# Patient Record
Sex: FEMALE | Race: ASIAN | NOT HISPANIC OR LATINO | URBAN - METROPOLITAN AREA
[De-identification: names, ages, dates, MRNs, and addresses within clinical notes are randomized per-mention and may not be internally consistent; named-entity substitution may affect disease eponyms.]

---

## 2021-11-01 ENCOUNTER — EMERGENCY (EMERGENCY)
Facility: HOSPITAL | Age: 31
LOS: 1 days | Discharge: HOME | End: 2021-11-01
Attending: EMERGENCY MEDICINE | Admitting: EMERGENCY MEDICINE
Payer: MEDICAID

## 2021-11-01 VITALS
RESPIRATION RATE: 18 BRPM | DIASTOLIC BLOOD PRESSURE: 86 MMHG | SYSTOLIC BLOOD PRESSURE: 126 MMHG | TEMPERATURE: 98 F | HEART RATE: 79 BPM | OXYGEN SATURATION: 100 %

## 2021-11-01 PROCEDURE — 99284 EMERGENCY DEPT VISIT MOD MDM: CPT

## 2021-11-01 NOTE — ED ADULT TRIAGE NOTE - CHIEF COMPLAINT QUOTE
Pt c/o vaginal bleeding. States she is ~8-9 weeks pregnant, had headache earlier today followed by lower abdominal cramping and vaginal bleeding this evening. No hx of miscarriage. Denies n/v/d. Denies PMHx

## 2021-11-02 VITALS
HEART RATE: 73 BPM | SYSTOLIC BLOOD PRESSURE: 106 MMHG | TEMPERATURE: 98 F | DIASTOLIC BLOOD PRESSURE: 68 MMHG | OXYGEN SATURATION: 100 % | RESPIRATION RATE: 15 BRPM

## 2021-11-02 LAB
ALBUMIN SERPL ELPH-MCNC: 4.9 G/DL — SIGNIFICANT CHANGE UP (ref 3.3–5)
ALP SERPL-CCNC: 67 U/L — SIGNIFICANT CHANGE UP (ref 40–120)
ALT FLD-CCNC: 14 U/L — SIGNIFICANT CHANGE UP (ref 4–33)
ANION GAP SERPL CALC-SCNC: 11 MMOL/L — SIGNIFICANT CHANGE UP (ref 7–14)
APPEARANCE UR: CLEAR — SIGNIFICANT CHANGE UP
AST SERPL-CCNC: 18 U/L — SIGNIFICANT CHANGE UP (ref 4–32)
BASOPHILS # BLD AUTO: 0.09 K/UL — SIGNIFICANT CHANGE UP (ref 0–0.2)
BASOPHILS NFR BLD AUTO: 1 % — SIGNIFICANT CHANGE UP (ref 0–2)
BILIRUB SERPL-MCNC: 0.2 MG/DL — SIGNIFICANT CHANGE UP (ref 0.2–1.2)
BILIRUB UR-MCNC: NEGATIVE — SIGNIFICANT CHANGE UP
BLD GP AB SCN SERPL QL: NEGATIVE — SIGNIFICANT CHANGE UP
BUN SERPL-MCNC: 12 MG/DL — SIGNIFICANT CHANGE UP (ref 7–23)
CALCIUM SERPL-MCNC: 10.1 MG/DL — SIGNIFICANT CHANGE UP (ref 8.4–10.5)
CHLORIDE SERPL-SCNC: 105 MMOL/L — SIGNIFICANT CHANGE UP (ref 98–107)
CO2 SERPL-SCNC: 23 MMOL/L — SIGNIFICANT CHANGE UP (ref 22–31)
COLOR SPEC: SIGNIFICANT CHANGE UP
CREAT SERPL-MCNC: 0.64 MG/DL — SIGNIFICANT CHANGE UP (ref 0.5–1.3)
DIFF PNL FLD: ABNORMAL
EOSINOPHIL # BLD AUTO: 0.39 K/UL — SIGNIFICANT CHANGE UP (ref 0–0.5)
EOSINOPHIL NFR BLD AUTO: 4.2 % — SIGNIFICANT CHANGE UP (ref 0–6)
GLUCOSE SERPL-MCNC: 86 MG/DL — SIGNIFICANT CHANGE UP (ref 70–99)
GLUCOSE UR QL: NEGATIVE — SIGNIFICANT CHANGE UP
HCG SERPL-ACNC: 2197 MIU/ML — SIGNIFICANT CHANGE UP
HCT VFR BLD CALC: 36.8 % — SIGNIFICANT CHANGE UP (ref 34.5–45)
HGB BLD-MCNC: 12.8 G/DL — SIGNIFICANT CHANGE UP (ref 11.5–15.5)
IANC: 5.23 K/UL — SIGNIFICANT CHANGE UP (ref 1.5–8.5)
IMM GRANULOCYTES NFR BLD AUTO: 0.2 % — SIGNIFICANT CHANGE UP (ref 0–1.5)
KETONES UR-MCNC: ABNORMAL
LEUKOCYTE ESTERASE UR-ACNC: NEGATIVE — SIGNIFICANT CHANGE UP
LYMPHOCYTES # BLD AUTO: 2.92 K/UL — SIGNIFICANT CHANGE UP (ref 1–3.3)
LYMPHOCYTES # BLD AUTO: 31.3 % — SIGNIFICANT CHANGE UP (ref 13–44)
MCHC RBC-ENTMCNC: 26.9 PG — LOW (ref 27–34)
MCHC RBC-ENTMCNC: 34.8 GM/DL — SIGNIFICANT CHANGE UP (ref 32–36)
MCV RBC AUTO: 77.3 FL — LOW (ref 80–100)
MONOCYTES # BLD AUTO: 0.69 K/UL — SIGNIFICANT CHANGE UP (ref 0–0.9)
MONOCYTES NFR BLD AUTO: 7.4 % — SIGNIFICANT CHANGE UP (ref 2–14)
NEUTROPHILS # BLD AUTO: 5.23 K/UL — SIGNIFICANT CHANGE UP (ref 1.8–7.4)
NEUTROPHILS NFR BLD AUTO: 55.9 % — SIGNIFICANT CHANGE UP (ref 43–77)
NITRITE UR-MCNC: NEGATIVE — SIGNIFICANT CHANGE UP
NRBC # BLD: 0 /100 WBCS — SIGNIFICANT CHANGE UP
NRBC # FLD: 0 K/UL — SIGNIFICANT CHANGE UP
PH UR: 6 — SIGNIFICANT CHANGE UP (ref 5–8)
PLATELET # BLD AUTO: 298 K/UL — SIGNIFICANT CHANGE UP (ref 150–400)
POTASSIUM SERPL-MCNC: 3.7 MMOL/L — SIGNIFICANT CHANGE UP (ref 3.5–5.3)
POTASSIUM SERPL-SCNC: 3.7 MMOL/L — SIGNIFICANT CHANGE UP (ref 3.5–5.3)
PROT SERPL-MCNC: 8.1 G/DL — SIGNIFICANT CHANGE UP (ref 6–8.3)
PROT UR-MCNC: NEGATIVE — SIGNIFICANT CHANGE UP
RBC # BLD: 4.76 M/UL — SIGNIFICANT CHANGE UP (ref 3.8–5.2)
RBC # FLD: 16.3 % — HIGH (ref 10.3–14.5)
RH IG SCN BLD-IMP: POSITIVE — SIGNIFICANT CHANGE UP
SODIUM SERPL-SCNC: 139 MMOL/L — SIGNIFICANT CHANGE UP (ref 135–145)
SP GR SPEC: 1.02 — SIGNIFICANT CHANGE UP (ref 1–1.05)
UROBILINOGEN FLD QL: SIGNIFICANT CHANGE UP
WBC # BLD: 9.34 K/UL — SIGNIFICANT CHANGE UP (ref 3.8–10.5)
WBC # FLD AUTO: 9.34 K/UL — SIGNIFICANT CHANGE UP (ref 3.8–10.5)

## 2021-11-02 PROCEDURE — 76817 TRANSVAGINAL US OBSTETRIC: CPT | Mod: 26

## 2021-11-02 RX ORDER — ACETAMINOPHEN 500 MG
975 TABLET ORAL ONCE
Refills: 0 | Status: COMPLETED | OUTPATIENT
Start: 2021-11-02 | End: 2021-11-02

## 2021-11-02 RX ADMIN — Medication 975 MILLIGRAM(S): at 03:26

## 2021-11-02 NOTE — ED PROVIDER NOTE - NSFOLLOWUPINSTRUCTIONS_ED_ALL_ED_FT
Thank you for visiting our Emergency Department, it has been a pleasure taking part in your healthcare.    Your discharge diagnosis is: threatened  Thank you for visiting our Emergency Department, it has been a pleasure taking part in your healthcare.    Your discharge diagnosis is: threatened , abnormal transvaginal ultrasound  Please take all discharge medications as indicated below:  Please continue all medications as rx'd by your PMD.  Please follow up with your PMD within x48 hours.  Please follow up with OBGYN within x48 hours.  PLEASE CALL YOUR OBGYN DOCTOR TODAY  A copy of resulted labs, imaging, and findings have been provided to you.   You have had a detailed discussion with your provider regarding your diagnosis, care management and discharge planning including, but not limited to: return precautions, follow up visits with existing or new providers, new prescriptions and/or medication changes, wound and/or splint/cast care or other care   aspects specific to your diagnosis and treatment. You have been given the opportunity to have your questions answered. At this time you have been deemed stable and fit for discharge.  Return precautions to the Emergency Department include but are not limited to: unrelenting nausea, vomiting, fever, chills, chest pain, shortness of breath, dizziness, chest or abdominal pain, worsening back pain, syncope, blood in urine or stool, headache that doesn't resolve, numbness or tingling, loss of sensation, loss of motor function, or any other concerning symptoms.

## 2021-11-02 NOTE — ED PROVIDER NOTE - PATIENT PORTAL LINK FT
You can access the FollowMyHealth Patient Portal offered by [facility] by registering at the following website: [email or web address]. By joining [employer]’s FollowMyHealth portal, you will also be able to view your health information using other applications (apps) compatible with our system. You can access the FollowMyHealth Patient Portal offered by [facility] by registering at the following website: [email or web address]. By joining [employer]’s FollowMyHealth portal, you will also be able to view your health information using other applications (apps) compatible with our system.

## 2021-11-02 NOTE — CHART NOTE - NSCHARTNOTEFT_GEN_A_CORE
Received call from ED regarding this patient, for pregnancy of unknown location. TVUS read reports gestational sac + fetal pole, clarified with radiology that this was seen. This confirms IUP.   Discussed with Tu, ED Provider.     KENDRA Hedrick, PGY-2

## 2021-11-02 NOTE — ED PROVIDER NOTE - CLINICAL SUMMARY MEDICAL DECISION MAKING FREE TEXT BOX
vaginal bleeding in pregnancy.  us ordered, labs sent.  dispo as per results.  signing out to Dr. Willson at 2am.

## 2021-11-02 NOTE — ED PROVIDER NOTE - PROGRESS NOTE DETAILS
Demetris WILLIS: Received sign out from my colleague Dr. Ruiz pt presents preg w vaginal bleeding pending labs and imaging at time of sign out, TVUS results noted had at length discussion w pt and  regarding c/f non viable preg and threat ab, they report they have OB appointment on Wed, instructed them to call OB today, and to keep their appointment for Wed, pt reports sill having some vaginal bleeding, however is controlled, Rh pos, vitals stable, pt agreeable w plan for dc w close ob folow up strict return precautions discussed and expressed understanding.

## 2021-11-02 NOTE — ED PROVIDER NOTE - OBJECTIVE STATEMENT
robyn: pt here with pregnancy and vaginal bleeding.  pt , knows her blood type b positive, has been to her gyn and was told on us it was "too early to see well".  doesn't have access to labs or us.  pt denies any other pmhx, allergies, pshx.  non smoker.  bleeding started today.  this is a desired pregnancy    denies N/V/abd pain/ HA/ fever/ chest pain/ SOB/ dysuria/ urgency/  extremity issue

## 2021-11-02 NOTE — ED PROVIDER NOTE - CARE PLAN
Principal Discharge DX:	Vaginal bleeding   1 Principal Discharge DX:	, threatened  Secondary Diagnosis:	Abnormal transvaginal ultrasound

## 2021-11-02 NOTE — ED ADULT NURSE NOTE - OBJECTIVE STATEMENT
pt received to int 12 , a&ox 4 and ambulatory , No prior pmh p/w vaginal bleed since 7pm. Pt verbalized she is 8=9 weeks pregnant. Denies pad saturation with vaginal bleed. Described bleed as spotting. Pt breathing even and unlabored on room air. Denies fever, chills, cough, SOB, chest pain, palpitations, dizziness, N/V/D, constipation, numbness, tingling. 20G IV placed to right AC. Labs collected and sent. Stretcher in lowest position, wheels locked, appropriate side rails in place, call bell in reach.

## 2021-11-03 ENCOUNTER — EMERGENCY (EMERGENCY)
Facility: HOSPITAL | Age: 31
LOS: 1 days | Discharge: HOME | End: 2021-11-03
Attending: EMERGENCY MEDICINE | Admitting: EMERGENCY MEDICINE
Payer: MEDICAID

## 2021-11-03 VITALS
DIASTOLIC BLOOD PRESSURE: 65 MMHG | OXYGEN SATURATION: 100 % | RESPIRATION RATE: 20 BRPM | SYSTOLIC BLOOD PRESSURE: 105 MMHG | TEMPERATURE: 97 F | HEART RATE: 65 BPM

## 2021-11-03 VITALS
TEMPERATURE: 98 F | HEART RATE: 73 BPM | OXYGEN SATURATION: 100 % | DIASTOLIC BLOOD PRESSURE: 78 MMHG | RESPIRATION RATE: 18 BRPM | SYSTOLIC BLOOD PRESSURE: 114 MMHG

## 2021-11-03 VITALS
RESPIRATION RATE: 18 BRPM | DIASTOLIC BLOOD PRESSURE: 55 MMHG | SYSTOLIC BLOOD PRESSURE: 109 MMHG | OXYGEN SATURATION: 100 % | HEART RATE: 66 BPM

## 2021-11-03 VITALS
RESPIRATION RATE: 19 BRPM | TEMPERATURE: 98 F | HEART RATE: 68 BPM | DIASTOLIC BLOOD PRESSURE: 65 MMHG | SYSTOLIC BLOOD PRESSURE: 101 MMHG | OXYGEN SATURATION: 100 %

## 2021-11-03 PROBLEM — Z78.9 OTHER SPECIFIED HEALTH STATUS: Chronic | Status: ACTIVE | Noted: 2021-11-02

## 2021-11-03 LAB
ALBUMIN SERPL ELPH-MCNC: 4 G/DL — SIGNIFICANT CHANGE UP (ref 3.3–5)
ALBUMIN SERPL ELPH-MCNC: 4.3 G/DL — SIGNIFICANT CHANGE UP (ref 3.3–5)
ALP SERPL-CCNC: 58 U/L — SIGNIFICANT CHANGE UP (ref 40–120)
ALP SERPL-CCNC: 66 U/L — SIGNIFICANT CHANGE UP (ref 40–120)
ALT FLD-CCNC: 12 U/L — SIGNIFICANT CHANGE UP (ref 4–33)
ALT FLD-CCNC: 16 U/L — SIGNIFICANT CHANGE UP (ref 4–33)
ANION GAP SERPL CALC-SCNC: 13 MMOL/L — SIGNIFICANT CHANGE UP (ref 7–14)
ANION GAP SERPL CALC-SCNC: 14 MMOL/L — SIGNIFICANT CHANGE UP (ref 7–14)
AST SERPL-CCNC: 18 U/L — SIGNIFICANT CHANGE UP (ref 4–32)
AST SERPL-CCNC: 19 U/L — SIGNIFICANT CHANGE UP (ref 4–32)
BASOPHILS # BLD AUTO: 0.06 K/UL — SIGNIFICANT CHANGE UP (ref 0–0.2)
BASOPHILS NFR BLD AUTO: 0.6 % — SIGNIFICANT CHANGE UP (ref 0–2)
BILIRUB SERPL-MCNC: 0.3 MG/DL — SIGNIFICANT CHANGE UP (ref 0.2–1.2)
BILIRUB SERPL-MCNC: 0.4 MG/DL — SIGNIFICANT CHANGE UP (ref 0.2–1.2)
BLD GP AB SCN SERPL QL: NEGATIVE — SIGNIFICANT CHANGE UP
BLOOD GAS VENOUS COMPREHENSIVE RESULT: SIGNIFICANT CHANGE UP
BUN SERPL-MCNC: 6 MG/DL — LOW (ref 7–23)
BUN SERPL-MCNC: 9 MG/DL — SIGNIFICANT CHANGE UP (ref 7–23)
CALCIUM SERPL-MCNC: 9.1 MG/DL — SIGNIFICANT CHANGE UP (ref 8.4–10.5)
CALCIUM SERPL-MCNC: 9.3 MG/DL — SIGNIFICANT CHANGE UP (ref 8.4–10.5)
CHLORIDE SERPL-SCNC: 104 MMOL/L — SIGNIFICANT CHANGE UP (ref 98–107)
CHLORIDE SERPL-SCNC: 109 MMOL/L — HIGH (ref 98–107)
CO2 SERPL-SCNC: 16 MMOL/L — LOW (ref 22–31)
CO2 SERPL-SCNC: 18 MMOL/L — LOW (ref 22–31)
CREAT SERPL-MCNC: 0.51 MG/DL — SIGNIFICANT CHANGE UP (ref 0.5–1.3)
CREAT SERPL-MCNC: 0.58 MG/DL — SIGNIFICANT CHANGE UP (ref 0.5–1.3)
CULTURE RESULTS: NO GROWTH — SIGNIFICANT CHANGE UP
EOSINOPHIL # BLD AUTO: 0.27 K/UL — SIGNIFICANT CHANGE UP (ref 0–0.5)
EOSINOPHIL NFR BLD AUTO: 2.5 % — SIGNIFICANT CHANGE UP (ref 0–6)
GLUCOSE SERPL-MCNC: 105 MG/DL — HIGH (ref 70–99)
GLUCOSE SERPL-MCNC: 113 MG/DL — HIGH (ref 70–99)
HCG SERPL-ACNC: 1396 MIU/ML — SIGNIFICANT CHANGE UP
HCT VFR BLD CALC: 34.7 % — SIGNIFICANT CHANGE UP (ref 34.5–45)
HCT VFR BLD CALC: 38.2 % — SIGNIFICANT CHANGE UP (ref 34.5–45)
HGB BLD-MCNC: 11.1 G/DL — LOW (ref 11.5–15.5)
HGB BLD-MCNC: 11.7 G/DL — SIGNIFICANT CHANGE UP (ref 11.5–15.5)
IANC: 7.95 K/UL — SIGNIFICANT CHANGE UP (ref 1.5–8.5)
IMM GRANULOCYTES NFR BLD AUTO: 0.2 % — SIGNIFICANT CHANGE UP (ref 0–1.5)
INR BLD: 1.17 RATIO — HIGH (ref 0.88–1.16)
LYMPHOCYTES # BLD AUTO: 1.71 K/UL — SIGNIFICANT CHANGE UP (ref 1–3.3)
LYMPHOCYTES # BLD AUTO: 16 % — SIGNIFICANT CHANGE UP (ref 13–44)
MCHC RBC-ENTMCNC: 24.4 PG — LOW (ref 27–34)
MCHC RBC-ENTMCNC: 24.8 PG — LOW (ref 27–34)
MCHC RBC-ENTMCNC: 30.6 GM/DL — LOW (ref 32–36)
MCHC RBC-ENTMCNC: 32 GM/DL — SIGNIFICANT CHANGE UP (ref 32–36)
MCV RBC AUTO: 77.5 FL — LOW (ref 80–100)
MCV RBC AUTO: 79.6 FL — LOW (ref 80–100)
MONOCYTES # BLD AUTO: 0.69 K/UL — SIGNIFICANT CHANGE UP (ref 0–0.9)
MONOCYTES NFR BLD AUTO: 6.4 % — SIGNIFICANT CHANGE UP (ref 2–14)
NEUTROPHILS # BLD AUTO: 7.95 K/UL — HIGH (ref 1.8–7.4)
NEUTROPHILS NFR BLD AUTO: 74.3 % — SIGNIFICANT CHANGE UP (ref 43–77)
NRBC # BLD: 0 /100 WBCS — SIGNIFICANT CHANGE UP
NRBC # BLD: 0 /100 WBCS — SIGNIFICANT CHANGE UP
NRBC # FLD: 0 K/UL — SIGNIFICANT CHANGE UP
NRBC # FLD: 0 K/UL — SIGNIFICANT CHANGE UP
PLATELET # BLD AUTO: 235 K/UL — SIGNIFICANT CHANGE UP (ref 150–400)
PLATELET # BLD AUTO: 250 K/UL — SIGNIFICANT CHANGE UP (ref 150–400)
POTASSIUM SERPL-MCNC: 3.7 MMOL/L — SIGNIFICANT CHANGE UP (ref 3.5–5.3)
POTASSIUM SERPL-MCNC: 3.9 MMOL/L — SIGNIFICANT CHANGE UP (ref 3.5–5.3)
POTASSIUM SERPL-SCNC: 3.7 MMOL/L — SIGNIFICANT CHANGE UP (ref 3.5–5.3)
POTASSIUM SERPL-SCNC: 3.9 MMOL/L — SIGNIFICANT CHANGE UP (ref 3.5–5.3)
PROT SERPL-MCNC: 7 G/DL — SIGNIFICANT CHANGE UP (ref 6–8.3)
PROT SERPL-MCNC: 7.5 G/DL — SIGNIFICANT CHANGE UP (ref 6–8.3)
PROTHROM AB SERPL-ACNC: 13.2 SEC — SIGNIFICANT CHANGE UP (ref 10.6–13.6)
RBC # BLD: 4.48 M/UL — SIGNIFICANT CHANGE UP (ref 3.8–5.2)
RBC # BLD: 4.8 M/UL — SIGNIFICANT CHANGE UP (ref 3.8–5.2)
RBC # FLD: 15.9 % — HIGH (ref 10.3–14.5)
RBC # FLD: 16 % — HIGH (ref 10.3–14.5)
RH IG SCN BLD-IMP: POSITIVE — SIGNIFICANT CHANGE UP
SARS-COV-2 RNA SPEC QL NAA+PROBE: SIGNIFICANT CHANGE UP
SODIUM SERPL-SCNC: 136 MMOL/L — SIGNIFICANT CHANGE UP (ref 135–145)
SODIUM SERPL-SCNC: 138 MMOL/L — SIGNIFICANT CHANGE UP (ref 135–145)
SPECIMEN SOURCE: SIGNIFICANT CHANGE UP
WBC # BLD: 10.7 K/UL — HIGH (ref 3.8–10.5)
WBC # BLD: 13.68 K/UL — HIGH (ref 3.8–10.5)
WBC # FLD AUTO: 10.7 K/UL — HIGH (ref 3.8–10.5)
WBC # FLD AUTO: 13.68 K/UL — HIGH (ref 3.8–10.5)

## 2021-11-03 PROCEDURE — 93010 ELECTROCARDIOGRAM REPORT: CPT

## 2021-11-03 PROCEDURE — 99285 EMERGENCY DEPT VISIT HI MDM: CPT | Mod: 25

## 2021-11-03 PROCEDURE — 99218: CPT

## 2021-11-03 PROCEDURE — 76830 TRANSVAGINAL US NON-OB: CPT | Mod: 26

## 2021-11-03 RX ORDER — SODIUM CHLORIDE 9 MG/ML
1000 INJECTION INTRAMUSCULAR; INTRAVENOUS; SUBCUTANEOUS ONCE
Refills: 0 | Status: COMPLETED | OUTPATIENT
Start: 2021-11-03 | End: 2021-11-03

## 2021-11-03 RX ORDER — METOCLOPRAMIDE HCL 10 MG
10 TABLET ORAL ONCE
Refills: 0 | Status: COMPLETED | OUTPATIENT
Start: 2021-11-03 | End: 2021-11-03

## 2021-11-03 RX ORDER — ONDANSETRON 8 MG/1
4 TABLET, FILM COATED ORAL ONCE
Refills: 0 | Status: COMPLETED | OUTPATIENT
Start: 2021-11-03 | End: 2021-11-03

## 2021-11-03 RX ORDER — MORPHINE SULFATE 50 MG/1
4 CAPSULE, EXTENDED RELEASE ORAL ONCE
Refills: 0 | Status: DISCONTINUED | OUTPATIENT
Start: 2021-11-03 | End: 2021-11-03

## 2021-11-03 RX ORDER — ACETAMINOPHEN 500 MG
1000 TABLET ORAL ONCE
Refills: 0 | Status: COMPLETED | OUTPATIENT
Start: 2021-11-03 | End: 2021-11-03

## 2021-11-03 RX ADMIN — Medication 400 MILLIGRAM(S): at 09:39

## 2021-11-03 RX ADMIN — Medication 1000 MILLIGRAM(S): at 12:15

## 2021-11-03 RX ADMIN — ONDANSETRON 4 MILLIGRAM(S): 8 TABLET, FILM COATED ORAL at 09:41

## 2021-11-03 RX ADMIN — MORPHINE SULFATE 4 MILLIGRAM(S): 50 CAPSULE, EXTENDED RELEASE ORAL at 05:00

## 2021-11-03 RX ADMIN — Medication 10 MILLIGRAM(S): at 09:41

## 2021-11-03 RX ADMIN — SODIUM CHLORIDE 1000 MILLILITER(S): 9 INJECTION INTRAMUSCULAR; INTRAVENOUS; SUBCUTANEOUS at 09:39

## 2021-11-03 RX ADMIN — SODIUM CHLORIDE 1000 MILLILITER(S): 9 INJECTION INTRAMUSCULAR; INTRAVENOUS; SUBCUTANEOUS at 12:14

## 2021-11-03 NOTE — CONSULT NOTE ADULT - ASSESSMENT
***pending*** 32y/o  @11w5d LMP 2021 presenting to the ED complaining of vaginal bleeding and cramping. Patient in stable condition, vaginal bleeding appropriate, saturated half of a medium sized pad in the ED in 5 hours, H/H stable.  - No acute GYN intervention at this time  - TVUS with similar findings as overnight, suspicious for failed pregnancy  - Patient to f/u with private OBGYN for mgmt, serial bHCG, and US  - Pt cleared for d/c    D/W Dr. Christian Hedrick, PGY-2

## 2021-11-03 NOTE — ED PROVIDER NOTE - ATTENDING CONTRIBUTION TO CARE
Afebrile. Patient anxious as RN to stay in room to hold her hand. Awake and Alert. Lungs CTA. Heart RRR. Abdomen soft NTND. CN II-XII grossly intact. Moves all extremities without lateralization.    Non-viable appearing early pregnancy yesterday in ED now with abdominal cramping and bleeding  HD monitoring  Pt has had 2 TV US in past 24 hours, declines another  Has Gyn follow-up  Likely threatened

## 2021-11-03 NOTE — ED PROVIDER NOTE - PATIENT PORTAL LINK FT
You can access the FollowMyHealth Patient Portal offered by [facility] by registering at the following website: [email or web address]. By joining [email or web address]’s FollowMyHealth portal, you will also be able to view your health information using other applications (apps) compatible with our system.

## 2021-11-03 NOTE — ED PROVIDER NOTE - PHYSICAL EXAMINATION
Well appearing, well nourished, awake, alert, oriented to person, place, time/situation and in moderate pain/nausea distress.    Airway patent    Eyes without scleral injection. No jaundice.    Strong pulse.    Respirations unlabored.    Abdomen soft, non-tender, no guarding.    Spine appears normal, range of motion is not limited, no muscle or joint tenderness.    Alert and oriented, no gross motor or sensory deficits.    Skin normal color for race, warm, dry and intact. No evidence of rash.    No SI/HI. Well appearing, well nourished, awake, alert, oriented to person, place, time/situation and in moderate pain/nausea distress.    Airway patent    Eyes without scleral injection. No jaundice.    Strong pulse.    Respirations unlabored.    Abdomen soft, non-tender, no guarding. Blood on Maxi-Pad and underwear.    Spine appears normal, range of motion is not limited, no muscle or joint tenderness.    Alert and oriented, no gross motor or sensory deficits.    Skin normal color for race, warm, dry and intact. No evidence of rash.    No SI/HI.

## 2021-11-03 NOTE — ED ADULT NURSE NOTE - OBJECTIVE STATEMENT
Receive pt. in Intake room 9 alert and oriented x 4, presenting to the ER with complaints of lower abdominal pain and vaginal bleeding. Pt. stated " I was here this morning I am having a miscarriage I was discharge and on my way home I had a lot of pain and bleeding.". Medicated as ordered, no c/o dizziness, pt. has moderate amount of vaginal bleeding. Sanitary pads given, will continue to monitor.

## 2021-11-03 NOTE — ED ADULT TRIAGE NOTE - CHIEF COMPLAINT QUOTE
Pt. reports she is 10 weeks pregnant c/o vaginal bleeding with clots that began at midnight. Also endorses abd cramping and used 3-4 pads. C/o generalized weakness.

## 2021-11-03 NOTE — ED PROVIDER NOTE - OBJECTIVE STATEMENT
Hortensia: This is the patient's third visit in 24 hours. C/o pelvic cramping and VB. Yesterday lab results unremarkable, but US found non-viable pregnancy and sub-chorionic hematoma. She was evaluated by OB a few hours ago, and was in so much pain causing nausea and vomiting that she returned to the ER prior to getting to her 9 AM appt. today. She has been continuously bleeding since yesterday. Hortensia: , 11 wk, 5 d preg. This is the patient's third visit in 24 hours. C/o pelvic cramping and VB. Yesterday lab results unremarkable other than HCG decreasing from ~2,200 to ~1,400, but US found non-viable pregnancy and sub-chorionic hematoma. She was evaluated by OB a few hours ago, and was in so much pain causing nausea and vomiting that she returned to the ER prior to getting to her 9 AM appt. today. She has been bleeding since yesterday.

## 2021-11-03 NOTE — ED CDU PROVIDER INITIAL DAY NOTE - MEDICAL DECISION MAKING DETAILS
this is a 31 y.o female whom is 11 weeks pregnant  this is the patients 3rd visit in past 24 hours, presented to the ED complaining of having vaginal bleeding and and cramping over the past day. Patient currently feeling better, patient was evaluated by obgyn and trae to go home, patient pain in control

## 2021-11-03 NOTE — ED CDU PROVIDER DISPOSITION NOTE - CLINICAL COURSE
this is a  11 wk, pregnant patient whom presented to the ED 3 times within the last day, came in for vaginal bleeding and cramping. patient has a trending down HCG, and also had labs within normal limits, her hemoglobin was normal as well. Pt was evaluated by OBGYN whom recommended for the patient to be discharged and follow up in clinic tomorrow morning. patient currently feels better no other complaints.

## 2021-11-03 NOTE — ED PROVIDER NOTE - OBJECTIVE STATEMENT
31F  p/w lower abdominal pain and vaginal bleeding.  Pt. was seen in the ED yesterday for same complaint.  TVUS showed a likely non-viable pregnancy.  Pt. was advised to f/u w/ GYN, which she did today morning.  Pt. was seen at the [facility] in [place] by a provider whose name she doesn't recall.  Pt. had a repeat TVUS and B-Hcg, which she doesn't know the results of.  Around midnight today, started having abd pain w/ vaginal bleeding and clots again.  Denies any cp, sob, f/c, sick contacts.  +nausea, no vomiting. 31F  p/w lower abdominal pain and vaginal bleeding.  LMP .  Pt. was seen in the ED yesterday for same complaint.  TVUS showed a likely non-viable pregnancy.  Pt. was advised to f/u w/ GYN, which she did today morning.  Pt. was seen at the [facility] [place] in [place] by a provider whose name she doesn't recall.  Pt. had a repeat TVUS and B-Hcg, which she doesn't know the results of.  Around midnight today, started having abd pain w/ vaginal bleeding and clots again.  Denies any cp, sob, f/c, sick contacts.  +nausea, no vomiting. 31F , LMP 2021 @ 11w5d by dates, p/w lower abdominal pain and vaginal bleeding.  LMP .  Pt. was seen in the ED yesterday for same complaint.  TVUS showed a likely non-viable pregnancy.  Pt. was advised to f/u w/ GYN, which she did today morning.  Pt. was seen at the [facility] [place] in [place] by a provider whose name she doesn't recall.  Pt. had a repeat TVUS and B-Hcg, which she doesn't know the results of.  Around midnight today, started having abd pain w/ vaginal bleeding and clots again.  Denies any cp, sob, f/c, sick contacts.  +nausea, no vomiting.

## 2021-11-03 NOTE — ED PROVIDER NOTE - PHYSICAL EXAMINATION
GENERAL: Patient awake alert NAD.  HEENT: NC/AT.  LUNGS: CTAB, no wheezes or crackles.  CARDIAC: RRR, no m/r/g.  ABDOMEN: Soft, lower abdominal tenderness, ND, No rebound, guarding.  EXT: No edema. No calf tenderness.  MSK: No pain with movement, no deformities.  NEURO: A&Ox3. Moving all extremities.  SKIN: Warm and dry. No rash.  PSYCH: Normal affect. GENERAL: Patient awake alert NAD.  HEENT: NC/AT.  LUNGS: CTAB, no wheezes or crackles.  CARDIAC: RRR, no m/r/g.  ABDOMEN: Soft, lower abdominal tenderness, ND, No rebound, guarding.  EXT: No edema. No calf tenderness.  MSK: No pain with movement, no deformities.  NEURO: A&Ox3. Moving all extremities.  SKIN: Warm and dry. No rash.  PSYCH: Normal affect.  : (Theodore + XI Kaminski): External genitalia no lesions, speculum moderate fresh blood and clots, os not visualized

## 2021-11-03 NOTE — ED ADULT NURSE REASSESSMENT NOTE - NS ED NURSE REASSESS COMMENT FT1
Pt hypotensive, fluid bolus currently infusing. Pt denies new complaints at this time.  at bedside. Pt safety maintained, continue to monitor.

## 2021-11-03 NOTE — ED CDU PROVIDER DISPOSITION NOTE - PATIENT PORTAL LINK FT
You can access the FollowMyHealth Patient Portal offered by [facility] by registering at the following website: [email or web address]. By joining [employer]’s FollowMyHealth portal, you will also be able to view your health information using other applications (apps) compatible with our system.

## 2021-11-03 NOTE — ED ADULT NURSE NOTE - NSIMPLEMENTINTERV_GEN_ALL_ED
Implemented All Universal Safety Interventions:  [place] to call system. Call bell, personal items and telephone within reach. Instruct patient to call for assistance. Room bathroom lighting operational. Non-slip footwear when patient is off stretcher. Physically safe environment: no spills, clutter or unnecessary equipment. Stretcher in lowest position, wheels locked, appropriate side rails in place.

## 2021-11-03 NOTE — ED PROVIDER NOTE - PROGRESS NOTE DETAILS
Hortensia: SBP 97 (not unexpected in a young, thin, pregnant patient). Will give IVF. Re-assess BP, Hb in a few hours. TVUS later today to see if passed fetus.

## 2021-11-03 NOTE — ED CDU PROVIDER DISPOSITION NOTE - ATTENDING CONTRIBUTION TO CARE
I (Ruby) agree with above, I performed a history and physical. Counseled bruce medical staff, physician assistant, and/or medical student on medical decision making as documented. Medical decisions and treatment interventions were made in real time during the patient encounter. Additionally and/or with the following exceptions:

## 2021-11-03 NOTE — ED PROVIDER NOTE - PROGRESS NOTE DETAILS
Kevon PGY3 - Reassessed pt., who feels improved.  Discussed results w/ pt., who understands them.  Pt. has appt w/ OB at 9am today.  All other questions and concerns have been addressed.  Pt. will be dc/d. [place]on PGY3 - Reassessed pt., who feels improved.  Discussed results w/ pt., who understands them.  Pt. has appt w/ OB at 9am tomorrow.  All other questions and concerns have been addressed.  Pt. will be dc/d.

## 2021-11-03 NOTE — ED ADULT NURSE NOTE - NSIMPLEMENTINTERV_GEN_ALL_ED
Implemented All Universal Safety Interventions:  [place] to call system. Call bell, personal items and telephone within reach. Instruct patient to call for assistance. Room bathroom lighting operational. Non-slip footwear when patient is off stretcher. Physically safe environment: no spills, clutter or unnecessary equipment. Stretcher in lowest position, wheels locked, appropriate side rails in place.

## 2021-11-03 NOTE — ED PROVIDER NOTE - NS ED ROS FT
General: denies fever, chills, weight loss/weight gain.  HENT: denies nasal congestion, sore throat, rhinorrhea, ear pain.  Eyes: denies visual changes, blurred vision, eye discharge, eye redness.  Neck: denies neck pain, neck swelling.  CV: denies chest pain, palpitations.  Resp: denies difficulty breathing, cough.  Abdominal: +nausea, abdominal pain; denies vomiting, blood in stool, dark stool.  : vaginal bleeding.  MSK: denies muscle aches, bony pain, leg pain, leg swelling.  Neuro: denies headaches, numbness, tingling, dizziness, lightheadedness.  Skin: denies rashes, cuts, bruises.  Hematologic: denies unexplained bruises.

## 2021-11-03 NOTE — ED ADULT NURSE REASSESSMENT NOTE - NS ED NURSE REASSESS COMMENT FT1
Pt in room 12; A&Ox4. Vitals stable. Pt currently resting in bed. Pt c/o abdominal pain 10/10 non-radiating. Pt states it feels like "bad pressure and cramps." Pt medicated as per MD.

## 2021-11-03 NOTE — ED CDU PROVIDER INITIAL DAY NOTE - OBJECTIVE STATEMENT
this is a 31 y.o female whom is 11 weeks pregnant  this is the patients 3rd visit in past 24 hours, presented to the ED complaining of having vaginal bleeding and and cramping over the past day, her HCG dropped from 2200 ot 1400, and there was no viable pregnancy found, patient currently feels better, states that the pain has improved, she used approx 1 pad in 5 hours mildly saturated, she denies having any headaches, dizziness, CP, SOB, [place], lightheadedness.

## 2021-11-03 NOTE — CONSULT NOTE ADULT - SUBJECTIVE AND OBJECTIVE BOX
Gyn Consult Note  ROSMERY SAXENA  31y  Female [id number]    HPI: 32y/o  @11w5d LMP 2021 presenting to the ED complaining of ___. GYN consulted for ___.  Patient reports ___. She denies ___.    Name of GYN Physician: Dr. Mae    OBHx:    GYNHx: Denies fibroids, cysts, endometriosis, STI's, Abnormal pap smears   PMHx:  PSHx:  Meds:   Allergies:    Vital Signs Last 24 Hrs  T(C): 36.3 (2021 10:17), Max: 36.8 (2021 03:53)  T(F): 97.4 (2021 10:17), Max: 98.3 (2021 03:53)  HR: 65 (2021 11:36) (64 - 73)  BP: 80/51 (2021 11:36) (80/51 - 114/78)  BP(mean): --  RR: 19 (2021 11:36) (18 - 20)  SpO2: 100% (2021 11:36) (100% - 100%)    Physical Exam:   General: sitting comfortably in bed, NAD   CV: RRR  Lungs: CTAB  Abd: Soft, non-tender, non-distended.  Bowel sounds present.    : No bleeding on pad. External labia wnl. Uterus wnl, nontender. Adnexa non palpable b/l. No CMT. Cervix closed.   Speculum Exam: No active bleeding from os.  Physiologic discharge.    Ext: non-tender b/l, no edema     LABS:             11.1   10.70 )-----------( 235      ( 2021 05:30 )             34.7       136  |  104  |  9   ----------------------------<  113<H>  3.7   |  18<L>  |  0.58    Ca    9.3      2021 05:30  TPro  7.0  /  Alb  4.0  /  TBili  0.3  /  DBili  x   /  AST  18  /  ALT  12  /  AlkPhos  58  11    Urinalysis Basic - ( 2021 01:16 )  Color: Light Yellow / Appearance: Clear / S.020 / pH: x  Gluc: x / Ketone: Trace  / Bili: Negative / Urobili: <2 mg/dL   Blood: x / Protein: Negative / Nitrite: Negative   Leuk Esterase: Negative / RBC: 4 /HPF / WBC 4 /HPF   Sq Epi: x / Non Sq Epi: x / Bacteria: Negative      RADIOLOGY & ADDITIONAL STUDIES: Gyn Consult Note  ROSMERY SAXENA  31y  Female [id number]    HPI: 30y/o  @11w5d LMP 2021 presenting to the ED complaining of vaginal bleeding and cramping.      Patient seen in the ED multiple times in the past two days for bleeding and cramping in 1st trimester pregnancy. Patient last dispo'd this AM She reports having cramping and bleeding upon getting home and returned. She last changed her pad 5hours ago. Pregnancy complicated by a subchorionic hematoma and a downtrending bHCG. She denies lightheadedness, dizziness, HA, CP, palpitations, N/V, urinary symptoms, and changes in bowel habits.    Name of GYN Physician: Dr. Mae    OBHx:   GYNHx: Denies fibroids, cysts, endometriosis, STI's, Abnormal pap smears   PMHx: Denies  PSHx: Breast sx  Meds: PNVs  Allergies: NKDA    Vital Signs Last 24 Hrs  T(C): 36.3 (2021 10:17), Max: 36.8 (2021 03:53)  T(F): 97.4 (2021 10:17), Max: 98.3 (2021 03:53)  HR: 65 (2021 11:36) (64 - 73)  BP: 80/51 (2021 11:36) (80/51 - 114/78)  BP(mean): --  RR: 19 (2021 11:36) (18 - 20)  SpO2: 100% (2021 11:36) (100% - 100%)    Physical Exam:   General: sitting comfortably in bed, NAD   CV: RRR  Lungs: CTAB  Abd: Soft, non-tender, non-distended.  Bowel sounds present.    : Some bleeding on pad. External labia wnl. Uterus wnl, nontender. Adnexa non palpable b/l. No CMT. Cervix closed.   Speculum Exam: No active bleeding from os. Pooling of dark blood in vault.   Ext: non-tender b/l, no edema     LABS:             11.1   10.70 )-----------( 235      ( 2021 05:30 )             34.7     11-  136  |  104  |  9   ----------------------------<  113<H>  3.7   |  18<L>  |  0.58    Ca    9.3      2021 05:30  TPro  7.0  /  Alb  4.0  /  TBili  0.3  /  DBili  x   /  AST  18  /  ALT  12  /  AlkPhos  58  11-03    Urinalysis Basic - ( 2021 01:16 )  Color: Light Yellow / Appearance: Clear / S.020 / pH: x  Gluc: x / Ketone: Trace  / Bili: Negative / Urobili: <2 mg/dL   Blood: x / Protein: Negative / Nitrite: Negative   Leuk Esterase: Negative / RBC: 4 /HPF / WBC 4 /HPF   Sq Epi: x / Non Sq Epi: x / Bacteria: Negative      RADIOLOGY & ADDITIONAL STUDIES:    < from: US Transvaginal (21 @ 14:34) >  EXAM:  US TRANSVAGINAL    PROCEDURE DATE:  Nov  3 2021   INTERPRETATION:  CLINICAL INFORMATION: Possible miscarriage. Evaluate fetus.. Patient is 12 weeks pregnant. Vaginal bleeding.  LMP: 2021  COMPARISON: 2021.  TECHNIQUE:  Endovaginal pelvic sonogram only.  FINDINGS:  Uterus: An irregular gestational sac is again identified in the endometrial cavity but is located more inferiorly than before. An echogenic focus within the gestational sac probably represents a fetal pole and measures 6 weeks, 2 days. No fetal heart rate. Subchorionic hemorrhage.  Right ovary: 2.6 cm x 1.0 cm x 1.8 cm. A corpus luteal cyst in the right ovary.  Left ovary: Not visualized.  Fluid: None.  IMPRESSION:  Findings are suspicious for failed pregnancy and not significantly changed from recent exam.  --- End of Report ---  RITA PENDLETON MD; Attending Radiologist  This document has been electronically signed. Nov  3 2021  3:43PM  < end of copied text >      < from: US Transvaginal, OB (21 @ 04:09) >  EXAM:  US OB TRANSVAGINAL    PROCEDURE DATE:  2021   INTERPRETATION:  CLINICAL INFORMATION: Vaginal bleeding in pregnancy.  LMP: 2021  Estimated Gestational Age by LMP: 11 weeks 5 days  COMPARISON: None available.  Endovaginal pelvic sonogram only.  FINDINGS:  Uterus: Subchorionic hematoma, measuring 1.6 x 0.6 x 1.3 cm.  Gestational Sac Size (mean): 3.2 cm  Shape: irregular  Crown Rump Length: 0.5 cm  Estimated Gestational Age: 6 weeks 2 days by CRL.  Yolk Sac:Not visualized  Fetal Heart Rate: N/A  Right ovary: 3.3 cm x 2.4 cm x 2.7 cm. Within normal limits. Flow is detected in the right ovary.  Left ovary: Not visualized.  Fluid: None.  IMPRESSION:  Irregularly shaped intrauterine gestational sac containing 5 mm fetal pole suspicious for nonviable pregnancy.  Size is discordant compared to dates.  Recommend follow-up with serial b-HCG and/or pelvic ultrasound.  Subchorionic hematoma, measuring 1.6 x 0.6 x 1.3 cm.  Left ovary is not visualized.  --- End of Report ---  IVETH CADE MD; Resident Radiology  This document has been electronically signed.  LILIA HAWTHORNE MD; Attending Radiologist  This document has been electronically signed. 2021  5:30AM  < end of copied text >

## 2021-11-03 NOTE — ED CDU PROVIDER INITIAL DAY NOTE - ATTENDING CONTRIBUTION TO CARE
I performed a face-to-face evaluation of the patient and performed a history and physical examination. I agree with the history and physical examination.    Hortensia: Threatened miscarriage. Likely non-viable pregnancy. Too much pain/nausea to go home. CDU for IVF, pain/nausea meds. Re-assess.

## 2021-11-03 NOTE — ED ADULT NURSE NOTE - OBJECTIVE STATEMENT
BREAK RN: Pt received to room 12 A&Ox4 and ambulatory. Pt C/O vaginal bleeding with clots x1 day; states she was seen in ED yesterday with negative findings. Pt states she is 10 weeks pregnant. Pt endorsing cramping to abd with saturation of 3-4 pads per day. Pt with associated weakness. Abd soft and non distended. Denies SOB, CP, dizziness, no pallor assessed at the present. Awaiting further orders at this time.

## 2021-11-03 NOTE — ED PROVIDER NOTE - NOTES
OBGYN consulted for vaginal bleeding and lower abdominal pain in a pt. w/ a potential non-viable pregnancy.

## 2021-11-03 NOTE — ED CDU PROVIDER DISPOSITION NOTE - NSFOLLOWUPINSTRUCTIONS_ED_ALL_ED_FT
Rest, drink plenty of fluids.  Advance activity as tolerated.  Continue all previously prescribed medications as directed.  Follow up with your primary care physician in 48-72 hours- bring copies of your results.  Return to the ER for worsening or persistent symptoms, and/or ANY NEW OR CONCERNING SYMPTOMS. If you have issues obtaining follow up, please call: [phone] to obtain a doctor or specialist who takes your insurance in your area.  You may call [phone] to make an appointment with the internal medicine clinic.    Please follow up with the obgyn doctor, please go to your obgyn appointment at 9am tomorrow morning.

## 2021-11-03 NOTE — ED PROVIDER NOTE - CLINICAL SUMMARY MEDICAL DECISION MAKING FREE TEXT BOX
Hortensia: Threatened miscarriage. Likely non-viable pregnancy. Too much pain/nausea to go home. CDU for IVF, pain/nausea meds. Re-assess.

## 2021-11-03 NOTE — ED PROVIDER NOTE - CLINICAL SUMMARY MEDICAL DECISION MAKING FREE TEXT BOX
31F  10 weeks pregnant p/w lower abdominal pain and vaginal bleeding.  VSS.  Pt. was evaluated in the ED yesterday for similar complaint and found to have a likely non-viable pregnancy.  Exam as above.  Will obtain labs to assess for significant blood loss anemia, consult OB, administer analgesics, reassess, and dispo pending results.

## 2021-11-03 NOTE — CHART NOTE - NSCHARTNOTEFT_GEN_A_CORE
R4   [facility] 2021 @ 11w5d measuring    TO BE COMPLETED R2 GYN Chart Note      LMP 2021 @ 11w5d presents w/ VB in the setting of recently diagnosed IUP w/ subchorionic hematoma. Patient seen in ED yesterday for same complaint, deemed stable for discharge and sent home with instructions to f/u outpt with OBGYN to continue to monitor, as this is a highly desired pregnancy. Patient established care with [facility] today before presenting to the ED. In the ED, vitals wnl, CBC shows H/H wnl. Spoke with [facility] attending, Dr. Paul. Patient to f/u with private OBGYN outpt at [facility] office tomorrow morning at 9am for continued monitoring and further management.    d/w Dr. Montanez PGY4 and attending Dr. Humberto Wise PGY2

## 2021-11-03 NOTE — ED PROVIDER NOTE - NSFOLLOWUPINSTRUCTIONS_ED_ALL_ED_FT
You were seen for abdominal pain and vaginal bleeding.    Your work-up in the ED did not reveal anything acutely concerning.    You have an appointment at the [facility] [place] at 9am.  Please go to this appointment.    If you have any concerning symptoms, seek immediate medical attention. You were seen for abdominal pain and vaginal bleeding.    Your work-up in the ED did not reveal anything acutely concerning.    You have an appointment at the [facility] [place] tomorrow (11/4) at 9am.  Please go to this appointment.    If you have any concerning symptoms, seek immediate medical attention.

## 2021-11-10 ENCOUNTER — OUTPATIENT (OUTPATIENT)
Dept: OUTPATIENT SERVICES | Facility: HOSPITAL | Age: 31
LOS: 1 days | End: 2021-11-10
Payer: MEDICAID

## 2021-11-10 VITALS
TEMPERATURE: 98 F | HEART RATE: 92 BPM | SYSTOLIC BLOOD PRESSURE: 103 MMHG | WEIGHT: 115.96 LBS | RESPIRATION RATE: 16 BRPM | DIASTOLIC BLOOD PRESSURE: 73 MMHG | HEIGHT: 63 IN | OXYGEN SATURATION: 100 %

## 2021-11-10 DIAGNOSIS — O02.1 MISSED ABORTION: ICD-10-CM

## 2021-11-10 DIAGNOSIS — Z87.2 PERSONAL HISTORY OF DISEASES OF THE SKIN AND SUBCUTANEOUS TISSUE: Chronic | ICD-10-CM

## 2021-11-10 DIAGNOSIS — Z01.818 ENCOUNTER FOR OTHER PREPROCEDURAL EXAMINATION: ICD-10-CM

## 2021-11-10 LAB
ALBUMIN SERPL ELPH-MCNC: 3.4 G/DL — SIGNIFICANT CHANGE UP (ref 3.3–5)
ALP SERPL-CCNC: 68 U/L — SIGNIFICANT CHANGE UP (ref 40–120)
ALT FLD-CCNC: 32 U/L — SIGNIFICANT CHANGE UP (ref 12–78)
ANION GAP SERPL CALC-SCNC: 7 MMOL/L — SIGNIFICANT CHANGE UP (ref 5–17)
AST SERPL-CCNC: 20 U/L — SIGNIFICANT CHANGE UP (ref 15–37)
BILIRUB SERPL-MCNC: 0.2 MG/DL — SIGNIFICANT CHANGE UP (ref 0.2–1.2)
BUN SERPL-MCNC: 15 MG/DL — SIGNIFICANT CHANGE UP (ref 7–23)
CALCIUM SERPL-MCNC: 9.2 MG/DL — SIGNIFICANT CHANGE UP (ref 8.5–10.1)
CHLORIDE SERPL-SCNC: 109 MMOL/L — HIGH (ref 96–108)
CO2 SERPL-SCNC: 25 MMOL/L — SIGNIFICANT CHANGE UP (ref 22–31)
CREAT SERPL-MCNC: 0.67 MG/DL — SIGNIFICANT CHANGE UP (ref 0.5–1.3)
GLUCOSE SERPL-MCNC: 100 MG/DL — HIGH (ref 70–99)
HCG SERPL-ACNC: 153 MIU/ML — HIGH
HCT VFR BLD CALC: 32.5 % — LOW (ref 34.5–45)
HGB BLD-MCNC: 10 G/DL — LOW (ref 11.5–15.5)
MCHC RBC-ENTMCNC: 24.2 PG — LOW (ref 27–34)
MCHC RBC-ENTMCNC: 30.8 GM/DL — LOW (ref 32–36)
MCV RBC AUTO: 78.7 FL — LOW (ref 80–100)
NRBC # BLD: 0 /100 WBCS — SIGNIFICANT CHANGE UP (ref 0–0)
PLATELET # BLD AUTO: 267 K/UL — SIGNIFICANT CHANGE UP (ref 150–400)
POTASSIUM SERPL-MCNC: 3.6 MMOL/L — SIGNIFICANT CHANGE UP (ref 3.5–5.3)
POTASSIUM SERPL-SCNC: 3.6 MMOL/L — SIGNIFICANT CHANGE UP (ref 3.5–5.3)
PROT SERPL-MCNC: 7.6 G/DL — SIGNIFICANT CHANGE UP (ref 6–8.3)
RBC # BLD: 4.13 M/UL — SIGNIFICANT CHANGE UP (ref 3.8–5.2)
RBC # FLD: 15.9 % — HIGH (ref 10.3–14.5)
SARS-COV-2 RNA SPEC QL NAA+PROBE: SIGNIFICANT CHANGE UP
SODIUM SERPL-SCNC: 141 MMOL/L — SIGNIFICANT CHANGE UP (ref 135–145)
WBC # BLD: 9.13 K/UL — SIGNIFICANT CHANGE UP (ref 3.8–10.5)
WBC # FLD AUTO: 9.13 K/UL — SIGNIFICANT CHANGE UP (ref 3.8–10.5)

## 2021-11-10 PROCEDURE — 80053 COMPREHEN METABOLIC PANEL: CPT

## 2021-11-10 PROCEDURE — 85027 COMPLETE CBC AUTOMATED: CPT

## 2021-11-10 PROCEDURE — 86850 RBC ANTIBODY SCREEN: CPT

## 2021-11-10 PROCEDURE — G0463: CPT

## 2021-11-10 PROCEDURE — 86901 BLOOD TYPING SEROLOGIC RH(D): CPT

## 2021-11-10 PROCEDURE — U0005: CPT

## 2021-11-10 PROCEDURE — U0003: CPT

## 2021-11-10 PROCEDURE — 84702 CHORIONIC GONADOTROPIN TEST: CPT

## 2021-11-10 PROCEDURE — 36415 COLL VENOUS BLD VENIPUNCTURE: CPT

## 2021-11-10 PROCEDURE — 86900 BLOOD TYPING SEROLOGIC ABO: CPT

## 2021-11-10 NOTE — H&P PST ADULT - LANGUAGE ASSISTANCE NEEDED
patient was offered and refused translation service/No-Patient/Caregiver offered and refused free interpretation services.

## 2021-11-10 NOTE — H&P PST ADULT - BMI (KG/M2)
20.5 Please feel free to contact us at [phone] if any  problem arises. After 6PM, Monday through Friday on weekends and on holidays, please call [phone] and ask for the radiology resident on call to be paged.

## 2021-11-10 NOTE — H&P PST ADULT - PROBLEM SELECTOR PLAN 1
check labs cbc cmp hcg type and screen  no medical clearance   preop instructions were given   patient is aware that she should socially isolate after today's covid test   patient and her  verbalize understanding of preop instructions

## 2021-11-10 NOTE — H&P PST ADULT - MALLAMPATI CLASS
[facility]  Orthopedics  Progress Note    Attending note:  Patient with 10% ejection fraction and needs diuresis.  No surgery today.    Patient Name: Bina Daniels  MRN: [id number]  Admission Date: 2/10/2020  Hospital Length of Stay: 2 days  Attending Provider: Christine Palomino MD  Primary Care Provider: Tho Haro MD  Follow-up For: Procedure(s) (LRB):  REMOVAL, HARDWARE, LOWER EXTREMITY, Irrigation and debridement Right ankle, synthes removal screw , Large C arm clock side (Right)    Post-Operative Day:    Subjective:     Principal Problem:Sepsis    Principal Orthopedic Problem: same    Interval History: Patient seen and examined at bedside. Her pain is controlled. Splint in place. To OR today for hardware removal and washout.     Review of patient's allergies indicates:   Allergen Reactions    Sulfa (sulfonamide antibiotics) Hives       Current Facility-Administered Medications   Medication    acetaminophen tablet 650 mg    albuterol-ipratropium 2.5 mg-0.5 mg/3 mL nebulizer solution 3 mL    atorvastatin tablet 10 mg    busPIRone tablet 5 mg    calcitRIOL capsule 0.25 mcg    ceFEPIme injection 2 g    dextrose 10% (D10W) Bolus    dextrose 10% (D10W) Bolus    dextrose 50% injection 12.5 g    dextrose 50% injection 25 g    docusate sodium capsule 50 mg    escitalopram oxalate tablet 10 mg    ferrous sulfate EC tablet 325 mg    fluticasone propionate 50 mcg/actuation nasal spray 100 mcg    furosemide injection 80 mg    glucagon (human recombinant) injection 1 mg    glucagon (human recombinant) injection 1 mg    glucose chewable tablet 16 g    glucose chewable tablet 16 g    glucose chewable tablet 24 g    glucose chewable tablet 24 g    heparin 25,000 units in dextrose 5% (100 units/ml) IV bolus from bag - ADDITIONAL PRN BOLUS - 30 units/kg    heparin 25,000 units in dextrose 5% (100 units/ml) IV bolus from bag - ADDITIONAL PRN BOLUS - 60 units/kg    insulin  "aspart U-100 pen 0-5 Units    insulin detemir U-100 pen 10 Units    melatonin tablet 6 mg    metoprolol succinate (TOPROL-XL) 24 hr split tablet 12.5 mg    ondansetron injection 4 mg    pantoprazole EC tablet 40 mg    polyethylene glycol packet 17 g    sodium bicarbonate tablet 650 mg    sodium chloride 0.9% flush 10 mL    vancomycin - pharmacy to dose     Objective:     Vital Signs (Most Recent):  Temp: 96.4 °F (35.8 °C) (02/11/20 2242)  Pulse: 87 (02/11/20 2242)  Resp: 20 (02/11/20 1945)  BP: (!) 127/57 (02/11/20 2242)  SpO2: (!) 93 % (02/11/20 2242) Vital Signs (24h Range):  Temp:  [96.4 °F (35.8 °C)-97.7 °F (36.5 °C)] 96.4 °F (35.8 °C)  Pulse:  [] 87  Resp:  [16-20] 20  SpO2:  [90 %-93 %] 93 %  BP: (114-138)/(57-72) 127/57     Weight: 62.6 kg (138 lb)  Height: 5' 4" (162.6 cm)  Body mass index is 23.69 kg/m².      Intake/Output Summary (Last 24 hours) at 2/12/2020 0538  Last data filed at 2/11/2020 1715  Gross per 24 hour   Intake 396 ml   Output 240 ml   Net 156 ml       Ortho/SPM Exam    Physical Exam:  NAD, A/O x 3.  Wound dressed with 4 x 4 cast padding, Ace wrap. No visible drainage.  No focal motor or sensory deficits noted.      Significant Labs: All pertinent labs within the past 24 hours have been reviewed.    Significant Imaging: I have reviewed and interpreted all pertinent imaging results/findings.    Assessment/Plan:     * Sepsis  Bina Daniels is a 61 y.o. female who is here today for irrigation debridement left ankle, removal of hardware.  Marked and consented, NPO.      - Weight bearing status:  Nonweightbearing  - Pain control: Per APS  - Antibiotics:  Preop ordered  - DVT Prophylaxis:  Will hold preop , SCD's at all times when not ambulating.  - PT/OT  - Lines/Drains:  None  - Dispo:  To OR today.  Marked and consented.          Infected hardware in right leg  Bina Daniels is a 61 y.o. female with medial and lateral wound dehiscence sp fixation right ankle.  - nonweightbearing " right lower extremity  - echo returned 10% EF  - NPO since midnight    To OR today          Carlos Stern MD  Orthopedics  [facility]       Class II - visualization of the soft palate, fauces, and uvula

## 2021-11-10 NOTE — H&P PST ADULT - NSICDXFAMILYHX_GEN_ALL_CORE_FT
FAMILY HISTORY:  Father  Still living? Yes, Estimated age: 55  Family history of renal stone, Age at diagnosis: Age Unknown    Mother  Still living? Yes, Estimated age: 49  FH: diabetes mellitus, Age at diagnosis: Age Unknown

## 2021-11-10 NOTE — H&P PST ADULT - FUNCTIONAL LEVEL PRIOR: TOILETING
Patient : Abram Carrasco Age: 79 year old Sex: male   MRN: [id number] Encounter Date: 10/14/2021      History     Chief Complaint:  Shortness of breath, COVID-19    Hospital  was used for [place] interpretation  Patient's wife is hospitalized with COVID-19 pneumonia.  He has been having symptoms over the past week but they have acutely worsened over the past 2 days.  He describes having more cough and shortness of breath.  He has noted to be hypoxic on presentation.          MEDICATIONS:  Medications reviewed with patient    ALLERGIES:  Reviewed    PAST MEDICAL HISTORY:  Patient denies any medical history    PAST SURGICAL HISTORY:  Patient denies surgical history    FAMILY HISTORY:  Noncontributory    SOCIAL HISTORY:  ALCOHOL: Denies  ILLICIT DRUGS: Denies  TOBACCO: Denies    Review of Systems   Constitutional: Positive for fatigue and fever.   HENT: Negative.    Eyes: Negative.  Negative for photophobia and visual disturbance.   Respiratory: Positive for cough and shortness of breath.    Cardiovascular: Negative.  Negative for chest pain.   Gastrointestinal: Negative.  Negative for abdominal pain, blood in stool, diarrhea, nausea and vomiting.   Endocrine: Negative.    Genitourinary: Negative.  Negative for flank pain.   Musculoskeletal: Negative.  Negative for neck pain and neck stiffness.   Skin: Negative.    Allergic/Immunologic: Negative.    Neurological: Negative.  Negative for dizziness, syncope, numbness and headaches.   Hematological: Negative.    Psychiatric/Behavioral: Negative.        Physical Exam     ED Triage Vitals   ED Triage Vitals Group      Temp 10/14/21 1345 98.9 °F (37.2 °C)      Heart Rate 10/14/21 1345 94      Resp 10/14/21 1345 (!) 27      BP 10/14/21 1345 (!) 154/89      SpO2 10/14/21 1345 (!) 88 %      EtCO2 mmHg --       Height 10/14/21 1802 5' 2\" (1.575 m)      Weight 10/14/21 1802 147 lb 7.8 oz (66.9 kg)      Weight Scale Used 10/14/21 1802 Scale in bed      BMI (Calculated)  10/14/21 1802 26.98      IBW/kg (Calculated) 10/14/21 1802 54.6     PULSE OXIMETRY: Oxygen saturation is 88% on room air which is low for patient    Physical Exam  Vitals reviewed.   Constitutional:       General: He is not in acute distress.     Appearance: Normal appearance. He is not ill-appearing or toxic-appearing.   HENT:      Head: Normocephalic and atraumatic.      Nose: Nose normal.      Mouth/Throat:      Mouth: Mucous membranes are moist.   Eyes:      Extraocular Movements: Extraocular movements intact.      Pupils: Pupils are equal, round, and reactive to light.   Cardiovascular:      Rate and Rhythm: Normal rate and regular rhythm.      Heart sounds: Normal heart sounds.   Pulmonary:      Effort: No respiratory distress.      Comments: Rhonchorous breath sounds bilaterally  Abdominal:      General: Bowel sounds are normal. There is no distension.      Palpations: Abdomen is soft.      Tenderness: There is no abdominal tenderness. There is no rebound.   Musculoskeletal:         General: No swelling, tenderness, deformity or signs of injury.      Cervical back: Neck supple. No rigidity. No muscular tenderness.   Skin:     General: Skin is warm.      Findings: No bruising or rash.   Neurological:      General: No focal deficit present.      Mental Status: He is alert and oriented to person, place, and time.   Psychiatric:         Mood and Affect: Mood normal.         ED Course     Procedures    Lab Results     Results for orders placed or performed during the hospital encounter of 10/14/21   Comprehensive Metabolic Panel   Result Value Ref Range    Fasting Status      Sodium 137 135 - 145 mmol/L    Potassium 3.9 3.4 - 5.1 mmol/L    Chloride 102 98 - 107 mmol/L    Carbon Dioxide 22 21 - 32 mmol/L    Anion Gap 17 10 - 20 mmol/L    Glucose 105 (H) 70 - 99 mg/dL    BUN 22 (H) 6 - 20 mg/dL    Creatinine 0.71 0.67 - 1.17 mg/dL    Glomerular Filtration Rate 89 >=60    BUN/ Creatinine Ratio 31 (H) 7 - 25     Calcium 8.1 (L) 8.4 - 10.2 mg/dL    Bilirubin, Total 0.5 0.2 - 1.0 mg/dL    GOT/AST 83 (H) <=37 Units/L    GPT/ALT 59 <64 Units/L    Alkaline Phosphatase 60 45 - 117 Units/L    Albumin 3.1 (L) 3.6 - 5.1 g/dL    Protein, Total 6.8 6.4 - 8.2 g/dL    Globulin 3.7 2.0 - 4.0 g/dL    A/G Ratio 0.8 (L) 1.0 - 2.4   CBC with Automated Differential (performable only)   Result Value Ref Range    WBC 3.0 (L) 4.2 - 11.0 K/mcL    RBC 5.50 4.50 - 5.90 mil/mcL    HGB 15.5 13.0 - 17.0 g/dL    HCT 48.1 39.0 - 51.0 %    MCV 87.5 78.0 - 100.0 fl    MCH 28.2 26.0 - 34.0 pg    MCHC 32.2 32.0 - 36.5 g/dL    RDW-CV 12.9 11.0 - 15.0 %    RDW-SD 41.8 39.0 - 50.0 fL     (L) 140 - 450 K/mcL    NRBC 0 <=0 /100 WBC    Neutrophil, Percent 56 %    Lymphocytes, Percent 28 %    Mono, Percent 14 %    Eosinophils, Percent 1 %    Basophils, Percent 0 %    Immature Granulocytes 1 %    Absolute Neutrophils 1.7 (L) 1.8 - 7.7 K/mcL    Absolute Lymphocytes 0.8 (L) 1.0 - 4.0 K/mcL    Absolute Monocytes 0.4 0.3 - 0.9 K/mcL    Absolute Eosinophils  0.0 0.0 - 0.5 K/mcL    Absolute Basophils 0.0 0.0 - 0.3 K/mcL    Absolute Immmature Granulocytes 0.0 0.0 - 0.2 K/mcL   C Reactive Protein   Result Value Ref Range    C-Reactive Protein 1.1 (H) <=1.0 mg/dL   Prothrombin Time   Result Value Ref Range    Prothrombin Time 10.9 9.7 - 11.8 sec    INR 1.0     Creatine Kinase   Result Value Ref Range     (H) 39 - 308 Units/L   Procalcitonin   Result Value Ref Range    Procalcitonin <0.05 <=0.09 ng/mL   Lactate Dehydrogenase   Result Value Ref Range    LD, Total 534 (H) 86 - 234 Units/L   Ferritin   Result Value Ref Range    Ferritin 3,503 (H) 26 - 388 ng/mL   Troponin I Ultra Sensitive   Result Value Ref Range    Troponin I, Ultra Sensitive 0.02 <=0.04 ng/mL   D Dimer, Quantitative   Result Value Ref Range    D Dimer, Quantitative 0.60 (H) <0.57 mg/L (FEU)   NT proBNP   Result Value Ref Range    NT-proBNP 152 <=450 pg/mL   Comprehensive Metabolic Panel   Result  Value Ref Range    Fasting Status      Sodium 140 135 - 145 mmol/L    Potassium 4.6 3.4 - 5.1 mmol/L    Chloride 106 98 - 107 mmol/L    Carbon Dioxide 23 21 - 32 mmol/L    Anion Gap 16 10 - 20 mmol/L    Glucose 133 (H) 70 - 99 mg/dL    BUN 22 (H) 6 - 20 mg/dL    Creatinine 0.66 (L) 0.67 - 1.17 mg/dL    Glomerular Filtration Rate >90 >=60    BUN/ Creatinine Ratio 33 (H) 7 - 25    Calcium 8.2 (L) 8.4 - 10.2 mg/dL    Bilirubin, Total 0.4 0.2 - 1.0 mg/dL    GOT/AST 65 (H) <=37 Units/L    GPT/ALT 53 <64 Units/L    Alkaline Phosphatase 61 45 - 117 Units/L    Albumin 2.8 (L) 3.6 - 5.1 g/dL    Protein, Total 6.3 (L) 6.4 - 8.2 g/dL    Globulin 3.5 2.0 - 4.0 g/dL    A/G Ratio 0.8 (L) 1.0 - 2.4   C Reactive Protein   Result Value Ref Range    C-Reactive Protein 1.1 (H) <=1.0 mg/dL   D Dimer, Quantitative   Result Value Ref Range    D Dimer, Quantitative 0.40 <0.57 mg/L (FEU)   Lactate Dehydrogenase   Result Value Ref Range    LD, Total 387 (H) 86 - 234 Units/L   Procalcitonin   Result Value Ref Range    Procalcitonin <0.05 <=0.09 ng/mL   Ferritin   Result Value Ref Range    Ferritin 3,044 (H) 26 - 388 ng/mL   Magnesium   Result Value Ref Range    Magnesium 2.0 1.7 - 2.4 mg/dL   CBC with Automated Differential (performable only)   Result Value Ref Range    WBC 1.4 (L) 4.2 - 11.0 K/mcL    RBC 5.32 4.50 - 5.90 mil/mcL    HGB 15.2 13.0 - 17.0 g/dL    HCT 45.6 39.0 - 51.0 %    MCV 85.7 78.0 - 100.0 fl    MCH 28.6 26.0 - 34.0 pg    MCHC 33.3 32.0 - 36.5 g/dL    RDW-CV 13.0 11.0 - 15.0 %    RDW-SD 40.4 39.0 - 50.0 fL     (L) 140 - 450 K/mcL    NRBC 0 <=0 /100 WBC    Neutrophil, Percent 47 %    Lymphocytes, Percent 30 %    Mono, Percent 22 %    Eosinophils, Percent 0 %    Basophils, Percent 1 %    Immature Granulocytes 0 %    Absolute Neutrophils 0.7 (L) 1.8 - 7.7 K/mcL    Absolute Lymphocytes 0.4 (L) 1.0 - 4.0 K/mcL    Absolute Monocytes 0.3 0.3 - 0.9 K/mcL    Absolute Eosinophils  0.0 0.0 - 0.5 K/mcL    Absolute Basophils 0.0  0.0 - 0.3 K/mcL    Absolute Immmature Granulocytes 0.0 0.0 - 0.2 K/mcL   Manual Differential   Result Value Ref Range    RBC Morphology Normal Normal    Platelet Morphology Normal Normal       EKG Results     EKG #1: EKG reviewed, normal sinus rhythm, no acute ischemic changes appreciated      Cardiac monitor: Normal sinus rhythm    Radiology Results     Imaging Results    None         ED Medication Orders (From admission, onward)    Ordered Start     Status Ordering Provider    10/14/21 1518 10/14/21 1530  dexamethasone (PF) (DECADRON) injection 6 mg  ONCE         Last MAR action: Given SAMIR GROSS               [facility]  Number of Diagnoses or Management Options  Diagnosis management comments: 4:20PM Patient resting comfortably and seems to be feeling better on supplemental oxygen.  Patient will be admitted for oxygen therapy.  Decadron will be started as well.  Patient's wife is admitted in the hospital as well with Covid pneumonia as well.  Case was discussed with [facility] hospitalist for admission.      Clinical Impression     ED Diagnosis   1. Acute hypoxemic respiratory failure due to COVID-19 (CMS/[facility])         Disposition        Admit 10/14/2021  4:24 PM  Telemetry Bed?: Yes  Admitting Physician: ISAIAS PRESTON [[id number]]  Is this a telephone or verbal order?: This is a telephone order from the admitting physician                     Samir Gross,   10/15/21 115       Samir Gross,   10/15/21 1155     0 = independent

## 2021-11-11 ENCOUNTER — TRANSCRIPTION ENCOUNTER (OUTPATIENT)
Age: 31
End: 2021-11-11

## 2021-11-11 RX ORDER — SODIUM CHLORIDE 9 MG/ML
1000 INJECTION, SOLUTION INTRAVENOUS
Refills: 0 | Status: DISCONTINUED | OUTPATIENT
Start: 2021-11-12 | End: 2021-11-12

## 2021-11-11 RX ORDER — HYDROMORPHONE HYDROCHLORIDE 2 MG/ML
0.5 INJECTION INTRAMUSCULAR; INTRAVENOUS; SUBCUTANEOUS ONCE
Refills: 0 | Status: DISCONTINUED | OUTPATIENT
Start: 2021-11-12 | End: 2021-11-12

## 2021-11-11 RX ORDER — ONDANSETRON 8 MG/1
4 TABLET, FILM COATED ORAL ONCE
Refills: 0 | Status: COMPLETED | OUTPATIENT
Start: 2021-11-12 | End: 2021-11-12

## 2021-11-11 RX ORDER — ACETAMINOPHEN 500 MG
1000 TABLET ORAL ONCE
Refills: 0 | Status: DISCONTINUED | OUTPATIENT
Start: 2021-11-12 | End: 2021-11-12

## 2021-11-12 ENCOUNTER — RESULT REVIEW (OUTPATIENT)
Age: 31
End: 2021-11-12

## 2021-11-12 ENCOUNTER — OUTPATIENT (OUTPATIENT)
Dept: OUTPATIENT SERVICES | Facility: HOSPITAL | Age: 31
LOS: 1 days | End: 2021-11-12
Payer: MEDICAID

## 2021-11-12 VITALS
HEIGHT: 63 IN | OXYGEN SATURATION: 100 % | DIASTOLIC BLOOD PRESSURE: 61 MMHG | WEIGHT: 115.96 LBS | RESPIRATION RATE: 16 BRPM | HEART RATE: 78 BPM | TEMPERATURE: 98 F | SYSTOLIC BLOOD PRESSURE: 104 MMHG

## 2021-11-12 VITALS
SYSTOLIC BLOOD PRESSURE: 101 MMHG | HEART RATE: 67 BPM | OXYGEN SATURATION: 100 % | RESPIRATION RATE: 16 BRPM | DIASTOLIC BLOOD PRESSURE: 77 MMHG

## 2021-11-12 DIAGNOSIS — Z01.818 ENCOUNTER FOR OTHER PREPROCEDURAL EXAMINATION: ICD-10-CM

## 2021-11-12 DIAGNOSIS — O02.1 MISSED ABORTION: ICD-10-CM

## 2021-11-12 DIAGNOSIS — Z87.2 PERSONAL HISTORY OF DISEASES OF THE SKIN AND SUBCUTANEOUS TISSUE: Chronic | ICD-10-CM

## 2021-11-12 PROCEDURE — 59820 CARE OF MISCARRIAGE: CPT

## 2021-11-12 PROCEDURE — 88305 TISSUE EXAM BY PATHOLOGIST: CPT

## 2021-11-12 PROCEDURE — 88305 TISSUE EXAM BY PATHOLOGIST: CPT | Mod: 26

## 2021-11-12 RX ADMIN — ONDANSETRON 4 MILLIGRAM(S): 8 TABLET, FILM COATED ORAL at 13:50

## 2021-11-12 RX ADMIN — SODIUM CHLORIDE 75 MILLILITER(S): 9 INJECTION, SOLUTION INTRAVENOUS at 09:37

## 2021-11-12 NOTE — BRIEF OPERATIVE NOTE - NSICDXBRIEFPROCEDURE_GEN_ALL_CORE_FT
PROCEDURES:  Dilation and curettage, uterus, using suction, for missed first trimester  2021 13:00:48  Jazmyn Degroot

## 2021-11-12 NOTE — ASU DISCHARGE PLAN (ADULT/PEDIATRIC) - CARE PROVIDER_API CALL
Jazmyn Degroot)  [address], [address] [place] [address]  Phone: [phone]  Fax: [phone]  Follow Up Time:

## 2021-11-12 NOTE — ASU DISCHARGE PLAN (ADULT/PEDIATRIC) - CALL YOUR DOCTOR IF YOU HAVE ANY OF THE FOLLOWING:
Bleeding that does not stop/Wound/Surgical Site with redness, or foul smelling discharge or pus/Nausea and vomiting that does not stop/Unable to urinate

## 2021-12-16 NOTE — ED ADULT NURSE NOTE - ISOLATION TYPE:
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 treated with total abdominal hysterectomy, bilateral salpingo-oophorectomy, omentectomy, appendectomy, left ureteroneocystostomy in August of 2019  She then received 6 cycles of adjuvant chemotherapy with carboplatin and Taxol that completed in January of 2020  Her inhibin B level has risen to 55 from a previous of less than 7  I reviewed her CT images of the chest abdomen pelvis performed on 12/13/2021  There is a 3 cm mass adjacent to the sigmoid colon  No other visible evidence of disease  I also reviewed CBC, CMP, hemoglobin A1c from 10/26/2021  Her performance status is 0   1  Based on the inhibin level and CT findings, this is 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  2  We discussed treatment options including systemic chemotherapy, radiation therapy, surgical site reduction  3  Based on her interval from previous therapy, apparent isolated recurrence, surgical cytoreduction would likely offer her the best long-term benefit  4  I discussed the risks and benefits of possible robotic assisted total laparoscopic resection of the pelvic mass, possible exploratory laparotomy, possible segmental colectomy, possible colostomy, all other indicated procedures  Urology will be consulted to place preoperative ureteral catheters with possible conversion to stents  She understands the risks and benefits of the operation including the potential risk of infection requiring reoperation and or colostomy, bladder dysfunction, possible need for ICU stay, increased risk of blood transfusion and she agrees to proceed as outlined  She understands that this is a complex surgery  Consent for surgeries obtained by me in the office      5  Preoperative PET-CT imaging to assess for occult extra pelvic disease    6  We discussed perioperative medication management  We also discussed bowel prep prior to surgery  7  Referral to urology to consent for preoperative ureteral catheters/stents  Relevant Medications    neomycin (MYCIFRADIN) 500 mg tablet    metroNIDAZOLE (FLAGYL) 500 mg tablet    polyethylene glycol (MiraLax) 17 GM/SCOOP powder    Other Relevant Orders    NM PET CT skull base to mid thigh    Case request operating room: LAPAROTOMY EXPLORATORY W/ ROBOTICS, CYSTOSCOPY (Completed)    Type and screen    Comprehensive metabolic panel    CBC and differential    APTT    Protime-INR    EKG 12 lead    Ambulatory referral to Urology              CHIEF COMPLAINT:  Here to discuss abnormal tumor markers and imaging findings          Problem:  Cancer Staging  Malignant neoplasm of left ovary (HCC)  Staging form: Ovary, Fallopian Tube, Primary Peritoneal, AJCC 8th Edition  - Clinical stage from 8/26/2019: FIGO Stage IC1, calculated as Stage IA (cT1a, cN0, cM0) - Signed by Dennise Mariano MD on 9/11/2019      Previous therapy:  Oncology History   Malignant neoplasm of left ovary ([facility] [address] )   8/26/2019 Initial Diagnosis    Malignant neoplasm of left ovary ([facility] [address] )     8/26/2019 -  Cancer Staged    Staging form: Ovary, Fallopian Tube, Primary Peritoneal, AJCC 8th Edition  - Clinical stage from 8/26/2019: FIGO Stage IC1, calculated as Stage IA (cT1a, cN0, cM0) - Signed by Dennise Mariano MD on 9/11/2019        Chemotherapy    Taxol 175 mg/m2 and carboplatin AUC 6 every 21 days   She received 5 cycles and is scheduled for cycle 6       8/26/2019 Surgery    Total abdominal hysterectomy, bilateral salpingo-oophorectomy, radical omentectomy, pelvic lymph node sampling, repair inherent cystostomy, left ureteroneocystostomy, appendectomy  -mixed stromal tumor with poorly differentiated sertoli-lydig cell component  -intraoperative tumor rupture           Patient ID: John Ireland is a 60 y o  female  Returns to discuss her inhibin B of 55 performed on 12/4/2021  This was subsequently followed by a CT of the chest abdomen pelvis done on 12/13/2021  I reviewed the images  There is a 3 x 2 9 cm mass in the left hemipelvis adjacent to the distal sigmoid colon/proximal rectum  The prior seroma has resolved  There is no other visible evidence of disease  She has 2 small pulmonary nodules that have been stable for 2 years  She had additional blood work performed on 10/26/2021 which revealed a total white blood cell count of 8 1, hemoglobin 13 2 grams/deciliter and platelet count of 715 K  Her CMP was within normal limits with a glucose of 182 mg/dL  Hemoglobin A1c was 6 5%  No other interval change in her medications or medical history since her last visit  She is asymptomatic  Review of Systems   Constitutional: Negative for activity change and unexpected weight change  HENT: Negative  Eyes: Negative  Respiratory: Negative  Cardiovascular: Negative  Gastrointestinal: Negative for abdominal distention and abdominal pain  Endocrine: Negative  Genitourinary: Negative for pelvic pain and vaginal bleeding  Musculoskeletal: Negative  Skin: Negative  Allergic/Immunologic: Negative  Neurological: Negative  Hematological: Negative  Psychiatric/Behavioral: Negative          Current Outpatient Medications   Medication Sig Dispense Refill    amLODIPine (NORVASC) 5 mg tablet Take 5 mg by mouth daily   4    buPROPion (WELLBUTRIN SR) 150 mg 12 hr tablet every evening   0    Cholecalciferol (VITAMIN D-3) 1000 units CAPS Take by mouth daily       escitalopram (LEXAPRO) 20 mg tablet 20 mg daily   0    metFORMIN (GLUCOPHAGE) 500 mg tablet 500 mg 2 (two) times a day with meals   0    Omega 3 1200 MG CAPS Take by mouth daily      scopolamine (TRANSDERM-SCOP) 1 5 mg/3 days TD 72 hr patch Place 1 patch on the skin every third day 2 patch 1    simvastatin (ZOCOR) 20 mg tablet TK 1 T PO QD IN THE KIMBERLYN  5  SUPER B COMPLEX/C PO Take by mouth daily       valsartan-hydrochlorothiazide (DIOVAN-HCT) 320-25 MG per tablet daily   0    metroNIDAZOLE (FLAGYL) 500 mg tablet Take 1 tablet (500 mg total) by mouth once for 1 dose Take at 9 PM the night before the procedure 1 tablet 0    neomycin (MYCIFRADIN) 500 mg tablet Take 2 tablets (1,000 mg total) by mouth 3 (three) times a day for 3 doses Take at 1 PM, 4 PM, and 9 PM the day before procedure  6 tablet 0    polyethylene glycol (MiraLax) 17 GM/SCOOP powder Mix with 64 oz Gatorade, begin 4 PM day before surgery per bowel prep instructions  238 g 0     No current facility-administered medications for this visit         Allergies   Allergen Reactions    Sulfites - Food Allergy Other (See Comments)     Causes flushing       Past Medical History:   Diagnosis Date    Anxiety     Cancer ([facility] [address] )     ovarian    [facility]     High cholesterol     Hypertension     Morbid obesity ([facility] )     Ovarian neoplasm 2019    PONV (postoperative nausea and vomiting)     Post-menopausal bleeding     Sleep apnea        Past Surgical History:   Procedure Laterality Date    BLADDER REPAIR N/A 2019    Procedure: REPAIR BLADDER; uretero yared cystotomy;  Surgeon: Didier Holm MD;  [facility]: [facility];  Service: Gynecology Oncology    CHOLECYSTECTOMY      IR IMAGE GUIDED ASPIRATION / DRAINAGE  2019    IR PORT PLACEMENT  2019    IR PORT PLACEMENT  2019    IR PORT REMOVAL  10/29/2019    IR PORT REMOVAL  2020    IL TOTAL ABDOM HYSTERECTOMY N/A 2019    Procedure: TOTAL ABDOMINAL HYSTERECTOMY, BILATERAL SALPINGO-OOPHORECTOMY, Radical omentectomy, pelvic lymph node sampling, staging biopsy, repair of cystotomy, appendectomy;  Surgeon: Didier Holm MD;  Location: [facility];  Service: Gynecology Oncology       OB History        1    Para   1    Term                AB        Living           SAB        IAB Ectopic        Multiple        Live Births                     No family history on file  The following portions of the patient's history were reviewed and updated as appropriate: allergies, current medications, past family history, past medical history, past social history, past surgical history and problem list       Objective:    Blood pressure 124/64, pulse 89, temperature 97 7 °F (36 5 °C), temperature source Temporal, resp  rate 17, height 5' 3" (1 6 m), weight 101 kg (222 lb 6 4 oz), SpO2 99 %  Body mass index is 39 4 kg/m²  Physical Exam  Vitals reviewed  Constitutional:       General: She is not in acute distress  Appearance: Normal appearance  She is not ill-appearing  HENT:      Head: Normocephalic and atraumatic  Eyes:      General: No scleral icterus  Right eye: No discharge  Left eye: No discharge  Conjunctiva/sclera: Conjunctivae normal    Cardiovascular:      Rate and Rhythm: Regular rhythm  Tachycardia present  Heart sounds: Normal heart sounds  No murmur heard  No friction rub  No gallop  Pulmonary:      Effort: Pulmonary effort is normal  No respiratory distress  Breath sounds: Normal breath sounds  No stridor  No wheezing or rhonchi  Abdominal:      Palpations: Abdomen is soft  Musculoskeletal:      Right lower leg: No edema  Left lower leg: No edema  Skin:     General: Skin is warm and dry  Coloration: Skin is not jaundiced  Findings: No rash  Neurological:      General: No focal deficit present  Mental Status: She is alert and oriented to person, place, and time  Cranial Nerves: No cranial nerve deficit  Motor: No weakness  Gait: Gait normal    Psychiatric:         Mood and Affect: Mood normal          Behavior: Behavior normal          Thought Content:  Thought content normal          Judgment: Judgment normal  None

## 2022-04-22 ENCOUNTER — ASOB RESULT (OUTPATIENT)
Age: 32
End: 2022-04-22

## 2022-04-22 ENCOUNTER — APPOINTMENT (OUTPATIENT)
Dept: ANTEPARTUM | Facility: CLINIC | Age: 32
End: 2022-04-22
Payer: MEDICAID

## 2022-04-22 PROCEDURE — 76801 OB US < 14 WKS SINGLE FETUS: CPT | Mod: 59

## 2022-04-22 PROCEDURE — 76813 OB US NUCHAL MEAS 1 GEST: CPT

## 2022-06-02 PROBLEM — Z00.00 ENCOUNTER FOR PREVENTIVE HEALTH EXAMINATION: Status: ACTIVE | Noted: 2022-06-02

## 2022-06-30 ENCOUNTER — APPOINTMENT (OUTPATIENT)
Dept: ANTEPARTUM | Facility: CLINIC | Age: 32
End: 2022-06-30

## 2022-06-30 ENCOUNTER — NON-APPOINTMENT (OUTPATIENT)
Age: 32
End: 2022-06-30

## 2022-06-30 ENCOUNTER — ASOB RESULT (OUTPATIENT)
Age: 32
End: 2022-06-30

## 2022-06-30 PROCEDURE — 76805 OB US >/= 14 WKS SNGL FETUS: CPT

## 2022-06-30 PROCEDURE — 76817 TRANSVAGINAL US OBSTETRIC: CPT | Mod: 59

## 2022-07-18 NOTE — ED ADULT NURSE NOTE - DRUG PRE-SCREENING (DAST -1)
Statement Selected

## 2022-08-23 ENCOUNTER — OUTPATIENT (OUTPATIENT)
Dept: INPATIENT UNIT | Facility: HOSPITAL | Age: 32
LOS: 1 days | Discharge: HOME | End: 2022-08-23

## 2022-08-23 DIAGNOSIS — Z87.2 PERSONAL HISTORY OF DISEASES OF THE SKIN AND SUBCUTANEOUS TISSUE: Chronic | ICD-10-CM

## 2022-08-23 DIAGNOSIS — O26.899 OTHER SPECIFIED PREGNANCY RELATED CONDITIONS, UNSPECIFIED TRIMESTER: ICD-10-CM

## 2022-08-23 DIAGNOSIS — Z3A.00 WEEKS OF GESTATION OF PREGNANCY NOT SPECIFIED: ICD-10-CM

## 2022-08-24 VITALS — DIASTOLIC BLOOD PRESSURE: 55 MMHG | SYSTOLIC BLOOD PRESSURE: 98 MMHG | HEART RATE: 73 BPM

## 2022-08-24 VITALS
HEART RATE: 70 BPM | TEMPERATURE: 98 F | SYSTOLIC BLOOD PRESSURE: 94 MMHG | DIASTOLIC BLOOD PRESSURE: 61 MMHG | RESPIRATION RATE: 16 BRPM

## 2022-08-24 DIAGNOSIS — Z98.890 OTHER SPECIFIED POSTPROCEDURAL STATES: Chronic | ICD-10-CM

## 2022-08-24 LAB
APPEARANCE UR: CLEAR — SIGNIFICANT CHANGE UP
BILIRUB UR-MCNC: NEGATIVE — SIGNIFICANT CHANGE UP
COLOR SPEC: SIGNIFICANT CHANGE UP
DIFF PNL FLD: NEGATIVE — SIGNIFICANT CHANGE UP
GLUCOSE UR QL: NEGATIVE — SIGNIFICANT CHANGE UP
KETONES UR-MCNC: NEGATIVE — SIGNIFICANT CHANGE UP
LEUKOCYTE ESTERASE UR-ACNC: NEGATIVE — SIGNIFICANT CHANGE UP
NITRITE UR-MCNC: NEGATIVE — SIGNIFICANT CHANGE UP
PH UR: 7 — SIGNIFICANT CHANGE UP (ref 5–8)
PROT UR-MCNC: NEGATIVE — SIGNIFICANT CHANGE UP
SP GR SPEC: 1.01 — SIGNIFICANT CHANGE UP (ref 1.01–1.05)
UROBILINOGEN FLD QL: SIGNIFICANT CHANGE UP

## 2022-08-24 PROCEDURE — 99213 OFFICE O/P EST LOW 20 MIN: CPT | Mod: 25

## 2022-08-24 PROCEDURE — 76818 FETAL BIOPHYS PROFILE W/NST: CPT | Mod: 26

## 2022-08-24 PROCEDURE — 76830 TRANSVAGINAL US NON-OB: CPT | Mod: 26

## 2022-08-24 NOTE — OB PROVIDER TRIAGE NOTE - NSICDXPASTSURGICALHX_GEN_ALL_CORE_FT
PAST SURGICAL HISTORY:  H/O cyst of breast 2014 - cyst removed on both breast    History of D&C

## 2022-08-24 NOTE — OB RN TRIAGE NOTE - NSICDXPASTSURGICALHX_GEN_ALL_CORE_FT
PAST SURGICAL HISTORY:  H/O cyst of breast 2014 - cyst removed on both breast     PAST SURGICAL HISTORY:  H/O cyst of breast 2014 - cyst removed on both breast    History of D&C

## 2022-08-24 NOTE — OB RN TRIAGE NOTE - NSNURSINGINSTR_OBGYN_ALL_OB_FT
pt evaluated for c/o pain and ruptured membranes, no evidence on exam, pt d/c to home with instructions given by geeta baxter

## 2022-08-24 NOTE — OB PROVIDER TRIAGE NOTE - NSOBPROVIDERNOTE_OBGYN_ALL_OB_FT
31 yo , EGA@ 29 6/7 weeks R/O Pre-term labor.   no S/S of pre-term labor at this time  pt stable for discharge  Pt to keep self very well hydrated  Discharge patient home.  Labor precautions and fetal movements count were reviewed; if not in labor, will follow up with your OB at the end of this weeks, please call for an appointment.  Prior notes, lab results (prenatal chart review, prenatal labs) and recommendations were reviewed;  All ordered tests results reviewed and interpreted.  Plan of care was reviewed with patient and family; patient states understanding of the above plan.  In total 35 minutes spent with established/new patient.

## 2022-08-24 NOTE — OB PROVIDER TRIAGE NOTE - ADDITIONAL INSTRUCTIONS
please drink 1-2 litter of fluid per day  follow up with your OB at the end of this weeks, please call for an appointment.

## 2022-08-24 NOTE — OB PROVIDER TRIAGE NOTE - HISTORY OF PRESENT ILLNESS
33 yo , EGA@ 29 6/7 weeks, presented to D&T with c/o lower abdominal pain at around 2100 after 4 episode of vomiting after dinner pt had french fries, giro and kenrick bread.  pt stated that after she vomited she woke up with a wet area on her bed. pt stated that the pain resolved while she was in the waiting room. pt stated that she didn't take any pain medication. Pt denies contractions, denies vaginal bleeding, and reports positive fetal movement.  Denies fever, chills, headaches, changes in vision, chest pain, palpitations, shortness of breath, cough, nausea, diarrhea, constipation, urinary symptoms, edema.    Prenatal care with Dr. Degroot  Prenatal course is uncomplicated.  Patient denies signs and symptoms of COVID 19; denies symptomatic illness; fully vaccinated.    No adverse reactions to anesthesia, no objections to blood transfusions if clinically indicated.  OB hx: miscarriage x 1 with D&C 2021  Med hx: denies  Surg hx: D&C x 1  GYN hx: denies hx of abnormal papsmear/cysts/fibroids/STDs  Meds: PNV, Vit D  Allergies: NKDA    Social hx: Denies alcohol, tobacco, drug use  Psych hx:  hx of anxiety no meds, pt stated that she has not been to therapy.

## 2022-08-24 NOTE — OB PROVIDER TRIAGE NOTE - NSHPLABSRESULTS_GEN_ALL_CORE
Urinalysis Basic - ( 24 Aug 2022 02:55 )    Color: Light Yellow / Appearance: Clear / S.013 / pH: x  Gluc: x / Ketone: Negative  / Bili: Negative / Urobili: <2 mg/dL   Blood: x / Protein: Negative / Nitrite: Negative   Leuk Esterase: Negative / RBC: x / WBC x   Sq Epi: x / Non Sq Epi: x / Bacteria: x

## 2022-10-24 ENCOUNTER — APPOINTMENT (OUTPATIENT)
Dept: ANTEPARTUM | Facility: CLINIC | Age: 32
End: 2022-10-24

## 2022-10-24 ENCOUNTER — ASOB RESULT (OUTPATIENT)
Age: 32
End: 2022-10-24

## 2022-10-24 PROCEDURE — 76819 FETAL BIOPHYS PROFIL W/O NST: CPT

## 2022-10-24 PROCEDURE — 76816 OB US FOLLOW-UP PER FETUS: CPT

## 2022-10-27 ENCOUNTER — INPATIENT (INPATIENT)
Facility: HOSPITAL | Age: 32
LOS: 5 days | Discharge: HOME | End: 2022-11-02
Attending: OBSTETRICS & GYNECOLOGY | Admitting: OBSTETRICS & GYNECOLOGY

## 2022-10-27 DIAGNOSIS — Z98.890 OTHER SPECIFIED POSTPROCEDURAL STATES: Chronic | ICD-10-CM

## 2022-10-27 DIAGNOSIS — Z87.2 PERSONAL HISTORY OF DISEASES OF THE SKIN AND SUBCUTANEOUS TISSUE: Chronic | ICD-10-CM

## 2022-10-28 ENCOUNTER — TRANSCRIPTION ENCOUNTER (OUTPATIENT)
Age: 32
End: 2022-10-28

## 2022-10-28 VITALS
WEIGHT: 147.71 LBS | HEIGHT: 63 IN | RESPIRATION RATE: 18 BRPM | SYSTOLIC BLOOD PRESSURE: 100 MMHG | HEART RATE: 100 BPM | OXYGEN SATURATION: 100 % | DIASTOLIC BLOOD PRESSURE: 69 MMHG | TEMPERATURE: 98 F

## 2022-10-28 LAB
BASOPHILS # BLD AUTO: 0.07 K/UL — SIGNIFICANT CHANGE UP (ref 0–0.2)
BASOPHILS NFR BLD AUTO: 0.7 % — SIGNIFICANT CHANGE UP (ref 0–2)
BLD GP AB SCN SERPL QL: NEGATIVE — SIGNIFICANT CHANGE UP
COVID-19 SPIKE DOMAIN AB INTERP: POSITIVE
COVID-19 SPIKE DOMAIN ANTIBODY RESULT: >250 U/ML — HIGH
EOSINOPHIL # BLD AUTO: 0.3 K/UL — SIGNIFICANT CHANGE UP (ref 0–0.5)
EOSINOPHIL NFR BLD AUTO: 3.1 % — SIGNIFICANT CHANGE UP (ref 0–6)
HCT VFR BLD CALC: 37.6 % — SIGNIFICANT CHANGE UP (ref 34.5–45)
HGB BLD-MCNC: 12.1 G/DL — SIGNIFICANT CHANGE UP (ref 11.5–15.5)
IANC: 5.92 K/UL — SIGNIFICANT CHANGE UP (ref 1.8–7.4)
IMM GRANULOCYTES NFR BLD AUTO: 2.2 % — HIGH (ref 0–0.9)
LYMPHOCYTES # BLD AUTO: 1.97 K/UL — SIGNIFICANT CHANGE UP (ref 1–3.3)
LYMPHOCYTES # BLD AUTO: 20.6 % — SIGNIFICANT CHANGE UP (ref 13–44)
MCHC RBC-ENTMCNC: 26.6 PG — LOW (ref 27–34)
MCHC RBC-ENTMCNC: 32.2 GM/DL — SIGNIFICANT CHANGE UP (ref 32–36)
MCV RBC AUTO: 82.6 FL — SIGNIFICANT CHANGE UP (ref 80–100)
MONOCYTES # BLD AUTO: 1.1 K/UL — HIGH (ref 0–0.9)
MONOCYTES NFR BLD AUTO: 11.5 % — SIGNIFICANT CHANGE UP (ref 2–14)
NEUTROPHILS # BLD AUTO: 5.92 K/UL — SIGNIFICANT CHANGE UP (ref 1.8–7.4)
NEUTROPHILS NFR BLD AUTO: 61.9 % — SIGNIFICANT CHANGE UP (ref 43–77)
NRBC # BLD: 0 /100 WBCS — SIGNIFICANT CHANGE UP (ref 0–0)
NRBC # FLD: 0.02 K/UL — HIGH (ref 0–0)
PLATELET # BLD AUTO: 178 K/UL — SIGNIFICANT CHANGE UP (ref 150–400)
RBC # BLD: 4.55 M/UL — SIGNIFICANT CHANGE UP (ref 3.8–5.2)
RBC # FLD: 21.8 % — HIGH (ref 10.3–14.5)
RH IG SCN BLD-IMP: POSITIVE — SIGNIFICANT CHANGE UP
SARS-COV-2 IGG+IGM SERPL QL IA: >250 U/ML — HIGH
SARS-COV-2 IGG+IGM SERPL QL IA: POSITIVE
T PALLIDUM AB TITR SER: NEGATIVE — SIGNIFICANT CHANGE UP
WBC # BLD: 9.57 K/UL — SIGNIFICANT CHANGE UP (ref 3.8–10.5)
WBC # FLD AUTO: 9.57 K/UL — SIGNIFICANT CHANGE UP (ref 3.8–10.5)

## 2022-10-28 RX ORDER — CITRIC ACID/SODIUM CITRATE 300-500 MG
15 SOLUTION, ORAL ORAL EVERY 6 HOURS
Refills: 0 | Status: DISCONTINUED | OUTPATIENT
Start: 2022-10-28 | End: 2022-10-29

## 2022-10-28 RX ORDER — OXYTOCIN 10 UNIT/ML
333.33 VIAL (ML) INJECTION
Qty: 20 | Refills: 0 | Status: DISCONTINUED | OUTPATIENT
Start: 2022-10-28 | End: 2022-10-31

## 2022-10-28 RX ORDER — CHLORHEXIDINE GLUCONATE 213 G/1000ML
1 SOLUTION TOPICAL ONCE
Refills: 0 | Status: COMPLETED | OUTPATIENT
Start: 2022-10-28 | End: 2022-10-28

## 2022-10-28 RX ORDER — SODIUM CHLORIDE 9 MG/ML
1000 INJECTION, SOLUTION INTRAVENOUS
Refills: 0 | Status: DISCONTINUED | OUTPATIENT
Start: 2022-10-28 | End: 2022-10-29

## 2022-10-28 RX ORDER — CALCIUM CARBONATE 500(1250)
2 TABLET ORAL ONCE
Refills: 0 | Status: COMPLETED | OUTPATIENT
Start: 2022-10-28 | End: 2022-10-28

## 2022-10-28 RX ORDER — FAMOTIDINE 10 MG/ML
20 INJECTION INTRAVENOUS ONCE
Refills: 0 | Status: COMPLETED | OUTPATIENT
Start: 2022-10-28 | End: 2022-10-28

## 2022-10-28 RX ADMIN — Medication 2 TABLET(S): at 19:47

## 2022-10-28 RX ADMIN — CHLORHEXIDINE GLUCONATE 1 APPLICATION(S): 213 SOLUTION TOPICAL at 21:44

## 2022-10-28 RX ADMIN — FAMOTIDINE 20 MILLIGRAM(S): 10 INJECTION INTRAVENOUS at 05:46

## 2022-10-28 NOTE — OB PROVIDER H&P - ASSESSMENT
31yo  @39w1d p/w IOL for oligohydramnios LA NENA 4.4  -admit to L&D  - for PO  - GBS neg  - EFW 3203  - Routine Labs  - FHT/TOCO  - Anesthesia Consult  - Anticipate     Crystal Berkowitz, PGY1  d/w Dr. Bacon

## 2022-10-28 NOTE — OB PROVIDER H&P - HISTORY OF PRESENT ILLNESS
33yo  @39w1d  p/f IOL for oligohydramnios. LA NENA performed at bedside: 4.4. Pt feels irregular contractions –VB, -LOF, +FM. Pt denies fever, chills, nausea, vomiting, diarrhea, headache, constipation, dizziness, syncope, chest pain, palpitations, shortness of breath, dysuria, urgency, frequency.  PNC: oligohydramnios  GBS: neg  EFW: 7#1  ObHx:  - MAB s/p D&C  GynHx: denies  MedHx: denies  SrgHx:  - breast lumpectomy, benign  PsychHx: denies  SocialHx: denies  AllergyHx: denies  RxHx: PNV, Vit D, iron, Pepcid 20 BID

## 2022-10-28 NOTE — OB RN PATIENT PROFILE - FALL HARM RISK - UNIVERSAL INTERVENTIONS
Bed in lowest position, wheels locked, appropriate side rails in place/Call bell, personal items and telephone in reach/Instruct patient to call for assistance before getting out of bed or chair/Non-slip footwear when patient is out of bed/[place] to call system/Physically safe environment - no spills, clutter or unnecessary equipment/Purposeful Proactive Rounding/Room/bathroom lighting operational, light cord in reach

## 2022-10-28 NOTE — OB RN PATIENT PROFILE - PRO INTERPRETER NEED 2
"7a-3p shift report  No hematuria. Urine via suprapubic yellow. CBI has been off since June 9th at 10am.   Yesterday's note from urologist mentions possibility of changing this sp tube for a two-way tube of similar caliber before he returns to his group home\" RN will call urology office to see if urologists could perform this task since when last by his LTC Significant hematuria resulted. Pt is currently on the Elliquis.      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  Patient is alert but disoriented to time and situation. Up with lift, turned and repositioned every 2 hours. Foam dressing to coccyx and left ankle. Bilateral rooke boots on. Remains on seizure precautions and fall Risk precautions.   VSS except diastolic low in 40's - hospitalist aware. Tylenol controlling pt's  low back pain. . On mechanical soft diet. R RAZIA BRIGHT.  Calls appropriately  " English

## 2022-10-28 NOTE — OB PROVIDER H&P - DOMESTIC TRAVEL HIGH RISK QUESTION
Writer spoke with patient who states that she has a few hydrocodone left. Writer asked patient to read the date off of the pill bottle and patient states it reads 2/20/20. Writer advised patient that the refill can not be ordered by law until 3/21/20. Patient states, \"I'm not over using them.\" Writer asks patient if it is possible that someone else may have taken her pills. \"Yes it's possible.\" Writer advised patient that she should secure her pills in a safe location because the refill can't be ordered until 3/21/20 by law and the only person that is going to suffer is the patient herself. She asks if we can order it today. Writer advised patient to call back first thing in the morning on 3/20/20 and request her refill then. Patient verbalized understanding and denied further questions or concerns at this time.   No

## 2022-10-28 NOTE — OB PROVIDER H&P - NS_PARA_OBGYN_ALL_OB_NU
"Anesthesia Post Evaluation    Patient: Marvin Ray    Procedure(s) Performed: Procedure(s) (LRB):  INCISION AND DRAINAGE (Left)  AMPUTATION, TOES  2-5 (Left)    Final Anesthesia Type: regional  Patient location during evaluation: PACU  Patient participation: Yes- Able to Participate  Level of consciousness: awake and alert and oriented  Post-procedure vital signs: reviewed and not stable  Pain management: adequate  Airway patency: patent  PONV status at discharge: No PONV  Anesthetic complications: no      Cardiovascular status: blood pressure returned to baseline  Respiratory status: unassisted, spontaneous ventilation and room air  Hydration status: euvolemic  Follow-up not needed.        Visit Vitals  /80   Pulse 82   Temp 36.4 °C (97.5 °F)   Resp 16   Ht 5' 10" (1.778 m)   Wt 100.1 kg (220 lb 10.9 oz)   SpO2 96%   BMI 31.66 kg/m²       Pain/Ralf Score: Pain Assessment Performed: Yes (11/16/2018 10:15 AM)  Presence of Pain: denies (11/16/2018 10:15 AM)  Pain Rating Prior to Med Admin: 0 (11/16/2018 10:15 AM)  Pain Rating Post Med Admin: 0 (11/16/2018 12:30 AM)  Ralf Score: 9 (11/16/2018 10:10 AM)        " 0

## 2022-10-29 ENCOUNTER — TRANSCRIPTION ENCOUNTER (OUTPATIENT)
Age: 32
End: 2022-10-29

## 2022-10-29 LAB
ALBUMIN SERPL ELPH-MCNC: 2.8 G/DL — LOW (ref 3.3–5)
ALP SERPL-CCNC: 171 U/L — HIGH (ref 40–120)
ALT FLD-CCNC: 12 U/L — SIGNIFICANT CHANGE UP (ref 4–33)
ANION GAP SERPL CALC-SCNC: 10 MMOL/L — SIGNIFICANT CHANGE UP (ref 7–14)
APTT BLD: 29.1 SEC — SIGNIFICANT CHANGE UP (ref 27–36.3)
AST SERPL-CCNC: 25 U/L — SIGNIFICANT CHANGE UP (ref 4–32)
BASOPHILS # BLD AUTO: 0.04 K/UL — SIGNIFICANT CHANGE UP (ref 0–0.2)
BASOPHILS NFR BLD AUTO: 0.2 % — SIGNIFICANT CHANGE UP (ref 0–2)
BILIRUB SERPL-MCNC: 0.4 MG/DL — SIGNIFICANT CHANGE UP (ref 0.2–1.2)
BUN SERPL-MCNC: 4 MG/DL — LOW (ref 7–23)
CALCIUM SERPL-MCNC: 8.9 MG/DL — SIGNIFICANT CHANGE UP (ref 8.4–10.5)
CHLORIDE SERPL-SCNC: 105 MMOL/L — SIGNIFICANT CHANGE UP (ref 98–107)
CO2 SERPL-SCNC: 22 MMOL/L — SIGNIFICANT CHANGE UP (ref 22–31)
CREAT SERPL-MCNC: 0.47 MG/DL — LOW (ref 0.5–1.3)
EGFR: 129 ML/MIN/1.73M2 — SIGNIFICANT CHANGE UP
EOSINOPHIL # BLD AUTO: 0 K/UL — SIGNIFICANT CHANGE UP (ref 0–0.5)
EOSINOPHIL NFR BLD AUTO: 0 % — SIGNIFICANT CHANGE UP (ref 0–6)
FIBRINOGEN PPP-MCNC: 723 MG/DL — HIGH (ref 330–520)
GLUCOSE SERPL-MCNC: 93 MG/DL — SIGNIFICANT CHANGE UP (ref 70–99)
HCT VFR BLD CALC: 39.8 % — SIGNIFICANT CHANGE UP (ref 34.5–45)
HGB BLD-MCNC: 12.8 G/DL — SIGNIFICANT CHANGE UP (ref 11.5–15.5)
IANC: 18.34 K/UL — HIGH (ref 1.8–7.4)
IMM GRANULOCYTES NFR BLD AUTO: 0.8 % — SIGNIFICANT CHANGE UP (ref 0–0.9)
INR BLD: 0.98 RATIO — SIGNIFICANT CHANGE UP (ref 0.88–1.16)
LDH SERPL L TO P-CCNC: 259 U/L — HIGH (ref 135–225)
LYMPHOCYTES # BLD AUTO: 0.81 K/UL — LOW (ref 1–3.3)
LYMPHOCYTES # BLD AUTO: 4 % — LOW (ref 13–44)
MCHC RBC-ENTMCNC: 26.2 PG — LOW (ref 27–34)
MCHC RBC-ENTMCNC: 32.2 GM/DL — SIGNIFICANT CHANGE UP (ref 32–36)
MCV RBC AUTO: 81.4 FL — SIGNIFICANT CHANGE UP (ref 80–100)
MONOCYTES # BLD AUTO: 0.94 K/UL — HIGH (ref 0–0.9)
MONOCYTES NFR BLD AUTO: 4.6 % — SIGNIFICANT CHANGE UP (ref 2–14)
NEUTROPHILS # BLD AUTO: 18.34 K/UL — HIGH (ref 1.8–7.4)
NEUTROPHILS NFR BLD AUTO: 90.4 % — HIGH (ref 43–77)
NRBC # BLD: 0 /100 WBCS — SIGNIFICANT CHANGE UP (ref 0–0)
NRBC # FLD: 0 K/UL — SIGNIFICANT CHANGE UP (ref 0–0)
PLATELET # BLD AUTO: 173 K/UL — SIGNIFICANT CHANGE UP (ref 150–400)
POTASSIUM SERPL-MCNC: 4 MMOL/L — SIGNIFICANT CHANGE UP (ref 3.5–5.3)
POTASSIUM SERPL-SCNC: 4 MMOL/L — SIGNIFICANT CHANGE UP (ref 3.5–5.3)
PROT SERPL-MCNC: 5.8 G/DL — LOW (ref 6–8.3)
PROTHROM AB SERPL-ACNC: 11.4 SEC — SIGNIFICANT CHANGE UP (ref 10.5–13.4)
RBC # BLD: 4.89 M/UL — SIGNIFICANT CHANGE UP (ref 3.8–5.2)
RBC # FLD: 21.7 % — HIGH (ref 10.3–14.5)
SODIUM SERPL-SCNC: 137 MMOL/L — SIGNIFICANT CHANGE UP (ref 135–145)
URATE SERPL-MCNC: 4.7 MG/DL — SIGNIFICANT CHANGE UP (ref 2.5–7)
WBC # BLD: 20.29 K/UL — HIGH (ref 3.8–10.5)
WBC # FLD AUTO: 20.29 K/UL — HIGH (ref 3.8–10.5)

## 2022-10-29 RX ORDER — KETOROLAC TROMETHAMINE 30 MG/ML
30 SYRINGE (ML) INJECTION EVERY 6 HOURS
Refills: 0 | Status: DISCONTINUED | OUTPATIENT
Start: 2022-10-29 | End: 2022-10-30

## 2022-10-29 RX ORDER — OXYCODONE HYDROCHLORIDE 5 MG/1
5 TABLET ORAL ONCE
Refills: 0 | Status: DISCONTINUED | OUTPATIENT
Start: 2022-10-29 | End: 2022-11-02

## 2022-10-29 RX ORDER — DIPHENHYDRAMINE HCL 50 MG
25 CAPSULE ORAL EVERY 6 HOURS
Refills: 0 | Status: DISCONTINUED | OUTPATIENT
Start: 2022-10-29 | End: 2022-11-02

## 2022-10-29 RX ORDER — CHOLECALCIFEROL (VITAMIN D3) 125 MCG
1 CAPSULE ORAL
Qty: 0 | Refills: 0 | DISCHARGE

## 2022-10-29 RX ORDER — NALOXONE HYDROCHLORIDE 4 MG/.1ML
0.1 SPRAY NASAL
Refills: 0 | Status: DISCONTINUED | OUTPATIENT
Start: 2022-10-29 | End: 2022-11-01

## 2022-10-29 RX ORDER — FAMOTIDINE 10 MG/ML
20 INJECTION INTRAVENOUS ONCE
Refills: 0 | Status: DISCONTINUED | OUTPATIENT
Start: 2022-10-29 | End: 2022-10-29

## 2022-10-29 RX ORDER — HEPARIN SODIUM 5000 [USP'U]/ML
5000 INJECTION INTRAVENOUS; SUBCUTANEOUS EVERY 12 HOURS
Refills: 0 | Status: DISCONTINUED | OUTPATIENT
Start: 2022-10-29 | End: 2022-11-02

## 2022-10-29 RX ORDER — ACETAMINOPHEN 500 MG
1000 TABLET ORAL ONCE
Refills: 0 | Status: COMPLETED | OUTPATIENT
Start: 2022-10-29 | End: 2022-10-29

## 2022-10-29 RX ORDER — CITRIC ACID/SODIUM CITRATE 300-500 MG
15 SOLUTION, ORAL ORAL ONCE
Refills: 0 | Status: DISCONTINUED | OUTPATIENT
Start: 2022-10-29 | End: 2022-10-29

## 2022-10-29 RX ORDER — SODIUM CHLORIDE 9 MG/ML
1000 INJECTION, SOLUTION INTRAVENOUS
Refills: 0 | Status: DISCONTINUED | OUTPATIENT
Start: 2022-10-29 | End: 2022-10-31

## 2022-10-29 RX ORDER — ACETAMINOPHEN 500 MG
650 TABLET ORAL ONCE
Refills: 0 | Status: COMPLETED | OUTPATIENT
Start: 2022-10-29 | End: 2022-10-29

## 2022-10-29 RX ORDER — FOLIC ACID 0.8 MG
0 TABLET ORAL
Qty: 0 | Refills: 0 | DISCHARGE

## 2022-10-29 RX ORDER — MORPHINE SULFATE 50 MG/1
2 CAPSULE, EXTENDED RELEASE ORAL ONCE
Refills: 0 | Status: DISCONTINUED | OUTPATIENT
Start: 2022-10-29 | End: 2022-10-31

## 2022-10-29 RX ORDER — CITRIC ACID/SODIUM CITRATE 300-500 MG
30 SOLUTION, ORAL ORAL ONCE
Refills: 0 | Status: COMPLETED | OUTPATIENT
Start: 2022-10-29 | End: 2022-10-29

## 2022-10-29 RX ORDER — SIMETHICONE 80 MG/1
80 TABLET, CHEWABLE ORAL EVERY 4 HOURS
Refills: 0 | Status: DISCONTINUED | OUTPATIENT
Start: 2022-10-29 | End: 2022-11-02

## 2022-10-29 RX ORDER — DIPHENHYDRAMINE HCL 50 MG
25 CAPSULE ORAL ONCE
Refills: 0 | Status: COMPLETED | OUTPATIENT
Start: 2022-10-29 | End: 2022-10-29

## 2022-10-29 RX ORDER — FAMOTIDINE 10 MG/ML
20 INJECTION INTRAVENOUS DAILY
Refills: 0 | Status: DISCONTINUED | OUTPATIENT
Start: 2022-10-29 | End: 2022-10-29

## 2022-10-29 RX ORDER — MAGNESIUM HYDROXIDE 400 MG/1
30 TABLET, CHEWABLE ORAL
Refills: 0 | Status: DISCONTINUED | OUTPATIENT
Start: 2022-10-29 | End: 2022-11-02

## 2022-10-29 RX ORDER — IBUPROFEN 200 MG
1 TABLET ORAL
Qty: 0 | Refills: 0 | DISCHARGE

## 2022-10-29 RX ORDER — IBUPROFEN 200 MG
600 TABLET ORAL EVERY 6 HOURS
Refills: 0 | Status: COMPLETED | OUTPATIENT
Start: 2022-10-29 | End: 2023-09-27

## 2022-10-29 RX ORDER — TETANUS TOXOID, REDUCED DIPHTHERIA TOXOID AND ACELLULAR PERTUSSIS VACCINE, ADSORBED 5; 2.5; 8; 8; 2.5 [IU]/.5ML; [IU]/.5ML; UG/.5ML; UG/.5ML; UG/.5ML
0.5 SUSPENSION INTRAMUSCULAR ONCE
Refills: 0 | Status: DISCONTINUED | OUTPATIENT
Start: 2022-10-29 | End: 2022-11-02

## 2022-10-29 RX ORDER — SODIUM CHLORIDE 9 MG/ML
500 INJECTION, SOLUTION INTRAVENOUS ONCE
Refills: 0 | Status: COMPLETED | OUTPATIENT
Start: 2022-10-29 | End: 2022-10-29

## 2022-10-29 RX ORDER — ONDANSETRON 8 MG/1
4 TABLET, FILM COATED ORAL EVERY 6 HOURS
Refills: 0 | Status: DISCONTINUED | OUTPATIENT
Start: 2022-10-29 | End: 2022-11-01

## 2022-10-29 RX ORDER — OXYCODONE HYDROCHLORIDE 5 MG/1
5 TABLET ORAL
Refills: 0 | Status: COMPLETED | OUTPATIENT
Start: 2022-10-29 | End: 2022-11-05

## 2022-10-29 RX ORDER — ACETAMINOPHEN 500 MG
2 TABLET ORAL
Qty: 0 | Refills: 0 | DISCHARGE

## 2022-10-29 RX ORDER — LANOLIN
1 OINTMENT (GRAM) TOPICAL EVERY 6 HOURS
Refills: 0 | Status: DISCONTINUED | OUTPATIENT
Start: 2022-10-29 | End: 2022-11-02

## 2022-10-29 RX ORDER — METOCLOPRAMIDE HCL 10 MG
10 TABLET ORAL ONCE
Refills: 0 | Status: DISCONTINUED | OUTPATIENT
Start: 2022-10-29 | End: 2022-10-29

## 2022-10-29 RX ORDER — DEXAMETHASONE 0.5 MG/5ML
4 ELIXIR ORAL EVERY 6 HOURS
Refills: 0 | Status: DISCONTINUED | OUTPATIENT
Start: 2022-10-29 | End: 2022-11-01

## 2022-10-29 RX ORDER — ACETAMINOPHEN 500 MG
975 TABLET ORAL
Refills: 0 | Status: DISCONTINUED | OUTPATIENT
Start: 2022-10-29 | End: 2022-11-02

## 2022-10-29 RX ORDER — OXYTOCIN 10 UNIT/ML
2 VIAL (ML) INJECTION
Qty: 30 | Refills: 0 | Status: DISCONTINUED | OUTPATIENT
Start: 2022-10-29 | End: 2022-10-29

## 2022-10-29 RX ORDER — FAMOTIDINE 10 MG/ML
20 INJECTION INTRAVENOUS ONCE
Refills: 0 | Status: COMPLETED | OUTPATIENT
Start: 2022-10-29 | End: 2022-10-29

## 2022-10-29 RX ORDER — OXYTOCIN 10 UNIT/ML
333.33 VIAL (ML) INJECTION
Qty: 20 | Refills: 0 | Status: DISCONTINUED | OUTPATIENT
Start: 2022-10-29 | End: 2022-10-31

## 2022-10-29 RX ADMIN — Medication 650 MILLIGRAM(S): at 08:42

## 2022-10-29 RX ADMIN — HEPARIN SODIUM 5000 UNIT(S): 5000 INJECTION INTRAVENOUS; SUBCUTANEOUS at 17:25

## 2022-10-29 RX ADMIN — Medication 400 MILLIGRAM(S): at 15:15

## 2022-10-29 RX ADMIN — Medication 30 MILLIGRAM(S): at 20:50

## 2022-10-29 RX ADMIN — Medication 30 MILLILITER(S): at 09:58

## 2022-10-29 RX ADMIN — Medication 15 MILLILITER(S): at 09:58

## 2022-10-29 RX ADMIN — Medication 650 MILLIGRAM(S): at 09:30

## 2022-10-29 RX ADMIN — Medication 30 MILLIGRAM(S): at 21:20

## 2022-10-29 RX ADMIN — Medication 25 MILLIGRAM(S): at 02:22

## 2022-10-29 RX ADMIN — Medication 2 MILLIUNIT(S)/MIN: at 05:08

## 2022-10-29 RX ADMIN — Medication 1000 MILLIGRAM(S): at 17:08

## 2022-10-29 RX ADMIN — Medication 1000 MILLIUNIT(S)/MIN: at 17:08

## 2022-10-29 RX ADMIN — FAMOTIDINE 20 MILLIGRAM(S): 10 INJECTION INTRAVENOUS at 00:25

## 2022-10-29 RX ADMIN — SODIUM CHLORIDE 500 MILLILITER(S): 9 INJECTION, SOLUTION INTRAVENOUS at 05:25

## 2022-10-29 NOTE — DISCHARGE NOTE OB - NS MD DC FALL RISK RISK
For information on Fall & Injury Prevention, visit: [email or web address] OR  [email or web address] OR  https://www.cdc.gov/steadi/patient.html

## 2022-10-29 NOTE — OB RN DELIVERY SUMMARY - NS_SEPSISRSKCALC_OBGYN_ALL_OB_FT
EOS calculated successfully. EOS Risk Factor: 0.5/1000 live births ([facility] national incidence); GA=39w2d; Temp=98.96; ROM=4.833; GBS='Negative'; Antibiotics='No antibiotics or any antibiotics < 2 hrs prior to birth'

## 2022-10-29 NOTE — OB PROVIDER LABOR PROGRESS NOTE - ASSESSMENT
- c/w PO cytotec  - CB placed without difficulty    Anita Blevins, PGY2  
- s/p CB  - Start pitocin     Anita Blevins, PGY2  d/w Dr. Marques 
31yo  @39w1d IOL for oligo  - For epi --> CB  - Continue PO    Crystal Berkowitz, PGY1  d/w Dr. Marques

## 2022-10-29 NOTE — OB PROVIDER DELIVERY SUMMARY - NSPROVIDERDELIVERYNOTE_OBGYN_ALL_OB_FT
Pre op dx  39.2 weeks gestation with cat 2 FHTs,  Oligo  post op dx same with nuchal cord  procedure PLTCS  Surgeon macchiarella  anesthesia epidural  qbl 251  iv 2000  ou 275  Findings viable female infant 7/7/9 apgars Pre op dx  39.2 weeks gestation with cat 2 FHTs,  Oligo  post op dx same with nuchal cord  procedure PLTCS  Surgeon macchiarella  anesthesia epidural  qbl 251  iv 2000  ou 275  Findings viable female infant 7/7/9 apgars, grossly normal appearing uterus and bilateral ovaries and fallopian tubes    Dictation# [id number]

## 2022-10-29 NOTE — OB RN DELIVERY SUMMARY - NS_LABORCHARACTER_OBGYN_ALL_OB
Induction of labor-AROM/Augmentation of labor/External electronic FM Induction of labor-AROM/Induction of labor-Medicinal/Augmentation of labor/External electronic FM

## 2022-10-29 NOTE — OB RN DELIVERY SUMMARY - BABY A: APGAR 10 MIN COLOR, DELIVERY
How Severe Is Your Skin Lesion?: mild
Have Your Skin Lesions Been Treated?: not been treated
Is This A New Presentation, Or A Follow-Up?: Skin Lesions
(1) body pink, extremities blue

## 2022-10-29 NOTE — DISCHARGE NOTE OB - CARE PROVIDER_API CALL
Jeannie Bacon (DO)  Obstetrics and Gynecology  [address] [place] [address]  Phone: [phone]  Follow Up Time:

## 2022-10-29 NOTE — OB PROVIDER DELIVERY SUMMARY - NSSELHIDDEN_OBGYN_ALL_OB_FT
[NS_DeliveryAttending1_OBGYN_ALL_OB_FT:[id number]] [NS_DeliveryAttending1_OBGYN_ALL_OB_FT:[id number]=],[NS_DeliveryAssist1_OBGYN_ALL_OB_FT:[id number]=]

## 2022-10-29 NOTE — OB NEONATOLOGY/PEDIATRICIAN DELIVERY SUMMARY - NSPEDSNEONOTESA_OBGYN_ALL_OB_FT
Peds called to OR for cat II. Baby is a 39+2 wk female born to a 34 y/o  mother via unscheduled C/S after cat II, IOL for Oligo with LA NENA 4.4. Maternal history significant for depression, breast lumpectomy. Maternal BT B+. PNL HepBsAg neg, HIV neg, RPR NR, and  rubella immune. GBS neg on . AROM at 06:17 on 10/29, clear fluids (ROM 5hr). Baby born with weak cry. WDSS. Apgars 7/7/9. Infant was apneic after 1.5 MOL, necessitating initiation of PPV for 2 minutes, followed by CPAP with max setting of 5L/50%. Infant was unable to be weaned from CPAP after 25 MOL with repeated desats to 80s whenever CPAP stopped, and therefore was transferred to NICU for continued respiratory support on 5L/30%. EOS 0.14. Mom plans to breastfeed, would like hepB. Mother is COVID negative.    Physical Exam:  Resp: + mild subcostal retractions, + tachypnea, clear to auscultation bilaterally  Cardio: Normal S1/S2, regular rate and rhythm, no murmurs  Abd: non distended, umbilical cord with 3 vessels  Skin: acrocyanotic, warm

## 2022-10-29 NOTE — OB NEONATOLOGY/PEDIATRICIAN DELIVERY SUMMARY - NS_RESUSCITPROC_OBGYN_ALL_OB
[facility] ENCOUNTER    URGENT CARE INITIAL EVALUATION    CHIEF COMPLAINT:    Chief Complaint   Patient presents with   • Rash     Room 4   • Office Visit       SUBJECTIVE:  Fredis Olivares is a 10 year old female, who presents with a complaint of facial rash.  Patient is accompanied by her dad who shares custody with patient's mother. For the past several weeks, patient has had an intermittent, red, itchy, raised facial rash.  She was taken to an outside medical clinic in December 2020 and prescribed hydrocortisone 2.5% to be applied to the affected areas.  She did have some improvement from this treatment.  However, patient's father states that the rash has not completely resolved.  Her mother made an appointment with a pediatric dermatologist for 03/03/2021.  Patient denies any fever, tongue/lip swelling or rash elsewhere on the body.  No new personal care products.    REVIEW OF SYSTEMS:  Pertinent positives and negatives included as above in HPI.    HISTORIES:  ALLERGIES:  No Known Allergies    MEDICATIONS:  Current Outpatient Medications   Medication Sig   • triamcinolone (ARISTOCORT) 0.1 % cream Apply topically 2 times daily for 14 days.     No current facility-administered medications for this visit.        Social History     Tobacco Use   • Smoking status: Never Smoker   • Smokeless tobacco: Never Used       OBJECTIVE:  Visit Vitals  BP (!) 122/70   Pulse 108   Temp 98.2 °F (36.8 °C) (Oral)   Resp (!) 16   Ht 5' 2.5\" (1.588 m)   Wt 45.2 kg   SpO2 99%   BMI 17.96 kg/m²        Physical Exam  Vitals signs and nursing note reviewed.   Constitutional:       General: She is not in acute distress.  HENT:      Head:        Nose: Nose normal.      Mouth/Throat:      Mouth: Mucous membranes are moist.   Eyes:      Extraocular Movements: Extraocular movements intact.      Conjunctiva/sclera: Conjunctivae normal.      Pupils: Pupils are equal, round, and reactive to light.   Neck:      Musculoskeletal: Normal range  of motion and neck supple.   Cardiovascular:      Rate and Rhythm: Normal rate.   Pulmonary:      Effort: Pulmonary effort is normal. No respiratory distress.   Skin:     General: Skin is dry.   Neurological:      Mental Status: She is alert.       MDM:  Facial rash consistent with dermatitis.  Differential includes eczema.  Given that patient has had some success with a low potency topical steroid cream, will prescribe a higher potency triamcinolone 0.1% cream to be applied to the affected areas twice daily.  Patient's father requested an earlier dermatology appointment.  I did call the central scheduling number, and was able to schedule an appointment with a dermatologist for 02/17/2021.  Patient was advised to avoid picking at the skin on her face, to avoid super imposed bacterial infection.  Seek medical attention for persistent or worsening symptoms.    ASSESSMENT:  1. Facial dermatitis      PLAN:  Orders Placed This Encounter   • triamcinolone (ARISTOCORT) 0.1 % cream     PATIENT INSTRUCTIONS:    · If symptoms worsen or do not improve, seek medical attention for re-evaluation.     FOLLOW-UP:  Follow-up with PCP as needed.    The patient indicated understanding of the diagnosis and agreed with the plan of care.   CPAP/Tactile Stimulation/Pulse Oximetry/FIO2

## 2022-10-29 NOTE — DISCHARGE NOTE OB - CARE PLAN
Principal Discharge DX:	Single delivery by  section  Assessment and plan of treatment:	39 weeks with cat 2 tracing/ oligo  -PCS, nuchal cord x1 noted    Routine Post op care   1

## 2022-10-29 NOTE — DISCHARGE NOTE OB - MATERIALS PROVIDED
PP materials provided PP materials provided/Vaccinations/[place]  Screening Program/  Immunization Record/Guide to Postpartum Care/[facility] Hearing Screen Program/Shaken Baby Prevention Handout/Birth Certificate Instructions

## 2022-10-29 NOTE — OB PROVIDER LABOR PROGRESS NOTE - NS_SUBJECTIVE/OBJECTIVE_OBGYN_ALL_OB_FT
VE after cervical balloon came out
Pt checked for increased pain
VE to place cervical balloon. Pt now feeling amenable to balloon after epidural and having soup.

## 2022-10-29 NOTE — DISCHARGE NOTE OB - MEDICATION SUMMARY - MEDICATIONS TO STOP TAKING
I will STOP taking the medications listed below when I get home from the hospital:    oxycodone-acetaminophen 5 mg-300 mg oral tablet  -- 1 tab(s) by mouth 4 times a day MDD:4 tabs  -- Caution federal law prohibits the transfer of this drug to any person other  than the person for whom it was prescribed.  May cause drowsiness.  Alcohol may intensify this effect.  Use care when operating dangerous machinery.  This drug may impair the ability to drive or operate machinery.  Use care until you become familiar with its effects.  This prescription cannot be refilled.  This product contains acetaminophen.  Do not use  with any other product containing acetaminophen to prevent possible liver damage.  Using more of this medication than prescribed may cause serious breathing problems.    oxycodone-acetaminophen 5 mg-325 mg oral tablet

## 2022-10-29 NOTE — DISCHARGE NOTE OB - HOSPITAL COURSE
39 weeks admitted for IOL for oligo    Procedures PCS for cat 2 tracing and oligo, nuchal cord x1    Routine post op management

## 2022-10-29 NOTE — OB RN INTRAOPERATIVE NOTE - NSSELHIDDEN_OBGYN_ALL_OB_FT
[NS_DeliveryAttending1_OBGYN_ALL_OB_FT:[id number]],[NS_DeliveryRN_OBGYN_ALL_OB_FT:[id number]],[NS_CirculateRN2_OBGYN_ALL_OB_FT:[id number]=]

## 2022-10-29 NOTE — DISCHARGE NOTE OB - MEDICATION SUMMARY - MEDICATIONS TO TAKE
I will START or STAY ON the medications listed below when I get home from the hospital:    ibuprofen 400 mg oral tablet  -- 1 tab(s) by mouth every 6 hours, As Needed  -- Indication: For pain    Tylenol 325 mg oral tablet  -- 2 tab(s) by mouth every 4 hours, As Needed  -- Indication: For pain    PNV Prenatal oral tablet  -- 1 tab(s) by mouth once a day  -- Indication: For continued need

## 2022-10-29 NOTE — OB PROVIDER LABOR PROGRESS NOTE - NS_OBIHIFHRDETAILS_OBGYN_ALL_OB_FT
130s, moderate variability, accels, no decels
150s, moderate variability, accels, no decels
baseline 150, moderate variability, +accels, -decels

## 2022-10-29 NOTE — DISCHARGE NOTE OB - PATIENT PORTAL LINK FT
You can access the FollowMyHealth Patient Portal offered by [facility] by registering at the following website: [email or web address]. By joining [email or web address]’s FollowMyHealth portal, you will also be able to view your health information using other applications (apps) compatible with our system.

## 2022-10-29 NOTE — OB RN DELIVERY SUMMARY - NSSELHIDDEN_OBGYN_ALL_OB_FT
[NS_DeliveryAttending1_OBGYN_ALL_OB_FT:[id number]],[NS_DeliveryRN_OBGYN_ALL_OB_FT:[id number]],[NS_CirculateRN2_OBGYN_ALL_OB_FT:[id number]=]

## 2022-10-30 LAB
BASOPHILS # BLD AUTO: 0.04 K/UL — SIGNIFICANT CHANGE UP (ref 0–0.2)
BASOPHILS NFR BLD AUTO: 0.2 % — SIGNIFICANT CHANGE UP (ref 0–2)
EOSINOPHIL # BLD AUTO: 0.13 K/UL — SIGNIFICANT CHANGE UP (ref 0–0.5)
EOSINOPHIL NFR BLD AUTO: 0.8 % — SIGNIFICANT CHANGE UP (ref 0–6)
HCT VFR BLD CALC: 31.1 % — LOW (ref 34.5–45)
HGB BLD-MCNC: 10.2 G/DL — LOW (ref 11.5–15.5)
IANC: 12.24 K/UL — HIGH (ref 1.8–7.4)
IMM GRANULOCYTES NFR BLD AUTO: 0.9 % — SIGNIFICANT CHANGE UP (ref 0–0.9)
LYMPHOCYTES # BLD AUTO: 14.7 % — SIGNIFICANT CHANGE UP (ref 13–44)
LYMPHOCYTES # BLD AUTO: 2.42 K/UL — SIGNIFICANT CHANGE UP (ref 1–3.3)
MCHC RBC-ENTMCNC: 27.1 PG — SIGNIFICANT CHANGE UP (ref 27–34)
MCHC RBC-ENTMCNC: 32.8 GM/DL — SIGNIFICANT CHANGE UP (ref 32–36)
MCV RBC AUTO: 82.5 FL — SIGNIFICANT CHANGE UP (ref 80–100)
MONOCYTES # BLD AUTO: 1.46 K/UL — HIGH (ref 0–0.9)
MONOCYTES NFR BLD AUTO: 8.9 % — SIGNIFICANT CHANGE UP (ref 2–14)
NEUTROPHILS # BLD AUTO: 12.24 K/UL — HIGH (ref 1.8–7.4)
NEUTROPHILS NFR BLD AUTO: 74.5 % — SIGNIFICANT CHANGE UP (ref 43–77)
NRBC # BLD: 0 /100 WBCS — SIGNIFICANT CHANGE UP (ref 0–0)
NRBC # FLD: 0 K/UL — SIGNIFICANT CHANGE UP (ref 0–0)
PLATELET # BLD AUTO: 146 K/UL — LOW (ref 150–400)
RBC # BLD: 3.77 M/UL — LOW (ref 3.8–5.2)
RBC # FLD: 21.6 % — HIGH (ref 10.3–14.5)
WBC # BLD: 16.44 K/UL — HIGH (ref 3.8–10.5)
WBC # FLD AUTO: 16.44 K/UL — HIGH (ref 3.8–10.5)

## 2022-10-30 RX ORDER — SENNA PLUS 8.6 MG/1
2 TABLET ORAL AT BEDTIME
Refills: 0 | Status: DISCONTINUED | OUTPATIENT
Start: 2022-10-30 | End: 2022-11-02

## 2022-10-30 RX ORDER — OXYCODONE HYDROCHLORIDE 5 MG/1
5 TABLET ORAL
Refills: 0 | Status: DISCONTINUED | OUTPATIENT
Start: 2022-10-30 | End: 2022-11-02

## 2022-10-30 RX ORDER — IBUPROFEN 200 MG
600 TABLET ORAL EVERY 6 HOURS
Refills: 0 | Status: DISCONTINUED | OUTPATIENT
Start: 2022-10-30 | End: 2022-11-02

## 2022-10-30 RX ADMIN — OXYCODONE HYDROCHLORIDE 5 MILLIGRAM(S): 5 TABLET ORAL at 14:15

## 2022-10-30 RX ADMIN — Medication 600 MILLIGRAM(S): at 18:38

## 2022-10-30 RX ADMIN — Medication 600 MILLIGRAM(S): at 17:58

## 2022-10-30 RX ADMIN — Medication 975 MILLIGRAM(S): at 07:36

## 2022-10-30 RX ADMIN — SENNA PLUS 2 TABLET(S): 8.6 TABLET ORAL at 22:11

## 2022-10-30 RX ADMIN — HEPARIN SODIUM 5000 UNIT(S): 5000 INJECTION INTRAVENOUS; SUBCUTANEOUS at 06:48

## 2022-10-30 RX ADMIN — Medication 975 MILLIGRAM(S): at 12:30

## 2022-10-30 RX ADMIN — SIMETHICONE 80 MILLIGRAM(S): 80 TABLET, CHEWABLE ORAL at 13:34

## 2022-10-30 RX ADMIN — OXYCODONE HYDROCHLORIDE 5 MILLIGRAM(S): 5 TABLET ORAL at 19:43

## 2022-10-30 RX ADMIN — Medication 975 MILLIGRAM(S): at 06:48

## 2022-10-30 RX ADMIN — Medication 975 MILLIGRAM(S): at 19:36

## 2022-10-30 RX ADMIN — Medication 975 MILLIGRAM(S): at 13:00

## 2022-10-30 RX ADMIN — Medication 975 MILLIGRAM(S): at 20:35

## 2022-10-30 RX ADMIN — OXYCODONE HYDROCHLORIDE 5 MILLIGRAM(S): 5 TABLET ORAL at 20:35

## 2022-10-30 RX ADMIN — MAGNESIUM HYDROXIDE 30 MILLILITER(S): 400 TABLET, CHEWABLE ORAL at 19:34

## 2022-10-30 RX ADMIN — Medication 975 MILLIGRAM(S): at 01:35

## 2022-10-30 RX ADMIN — Medication 30 MILLIGRAM(S): at 09:56

## 2022-10-30 RX ADMIN — Medication 30 MILLIGRAM(S): at 10:30

## 2022-10-30 RX ADMIN — OXYCODONE HYDROCHLORIDE 5 MILLIGRAM(S): 5 TABLET ORAL at 13:34

## 2022-10-30 RX ADMIN — HEPARIN SODIUM 5000 UNIT(S): 5000 INJECTION INTRAVENOUS; SUBCUTANEOUS at 17:57

## 2022-10-30 RX ADMIN — Medication 30 MILLIGRAM(S): at 02:30

## 2022-10-30 RX ADMIN — Medication 975 MILLIGRAM(S): at 00:44

## 2022-10-30 RX ADMIN — Medication 30 MILLIGRAM(S): at 03:05

## 2022-10-30 NOTE — PROVIDER CONTACT NOTE (OTHER) - ACTION/TREATMENT ORDERED:
Anesthesiologist stated he will contact OB Intern at [phone] and discuss at plan of care. Awaiting plan form OB team.
Cary Haines MD came to assess patient. MD will be contacting anesthesia to assess patient and consider patch. no further action ordered. RN will continue to monitor.

## 2022-10-30 NOTE — CHART NOTE - NSCHARTNOTEFT_GEN_A_CORE
R1 EVENT NOTE    Patient continues to have serosanguinous drainage from epidural site. She denies HA and dizziness. She is ambulating, voiding, and passing flatus. She has b/l LE edema.     Plan of care discussed with Dr. Degroot and anesthesia team. Due to lack of HA it is thought that the fluid is interstitial fluid from the skin and not CSF. Recommendation was made to apply pressure dressing. Plan to continue to monitor overnight. If the drainage does not resolve by the AM, consider neurosurgery consult.

## 2022-10-30 NOTE — PROGRESS NOTE ADULT - ASSESSMENT
31y/o  POD#1 from pLTCS for cat 2 tracing uncomplicated. . H/H 12.8/39.8. Overall, patient is recovering well postoperatively.    #Postpartum state  - Site of epidural inspected, serosanguinous fluid draining and anesthesia was notified around 4AM. Anesthesia will see the patient in the AM.  - Continue with po analgesia  - Increase ambulation  - Continue regular diet  - Check CBC  - Incision dressing removed  - DVT prophylaxis with 5000u Heparin    Cary Haines  PGY-1

## 2022-10-30 NOTE — PROGRESS NOTE ADULT - ATTENDING COMMENTS
Patient seen and examined at bedside. Patient seen this morning by anesthesia for c/o leakage from site but exam WNL. Will continue to monitor. Possible discharge home tomorrow.

## 2022-10-30 NOTE — PROVIDER CONTACT NOTE (OTHER) - ASSESSMENT
Moderate serosanguineous drainage noted on dressing
fundus firm light bleeding. patient denies feeling dizzy or lightheaded. states she does not have a headache or blurry vision. v/s WNL

## 2022-10-30 NOTE — PROVIDER CONTACT NOTE (OTHER) - SITUATION
when assisting patient to bathroom, RN noticed serosanguineous spot on purple tan under patient.
Patient having moderate drainage from the epidural site. Patient denies any pain at site, headaches, dizziness or lightheadedness.

## 2022-10-30 NOTE — PROVIDER CONTACT NOTE (OTHER) - RECOMMENDATIONS
Dressing changed, OB Intern Crystal made aware who directed me to call Anesthesiologist at [phone].
RN requests OB to assess patient

## 2022-10-31 RX ORDER — POLYETHYLENE GLYCOL 3350 17 G/17G
17 POWDER, FOR SOLUTION ORAL DAILY
Refills: 0 | Status: DISCONTINUED | OUTPATIENT
Start: 2022-10-31 | End: 2022-11-02

## 2022-10-31 RX ADMIN — Medication 975 MILLIGRAM(S): at 02:27

## 2022-10-31 RX ADMIN — Medication 975 MILLIGRAM(S): at 03:25

## 2022-10-31 RX ADMIN — HEPARIN SODIUM 5000 UNIT(S): 5000 INJECTION INTRAVENOUS; SUBCUTANEOUS at 05:40

## 2022-10-31 RX ADMIN — Medication 600 MILLIGRAM(S): at 12:06

## 2022-10-31 RX ADMIN — Medication 600 MILLIGRAM(S): at 06:40

## 2022-10-31 RX ADMIN — OXYCODONE HYDROCHLORIDE 5 MILLIGRAM(S): 5 TABLET ORAL at 09:16

## 2022-10-31 RX ADMIN — SENNA PLUS 2 TABLET(S): 8.6 TABLET ORAL at 21:52

## 2022-10-31 RX ADMIN — Medication 975 MILLIGRAM(S): at 16:08

## 2022-10-31 RX ADMIN — Medication 600 MILLIGRAM(S): at 02:35

## 2022-10-31 RX ADMIN — POLYETHYLENE GLYCOL 3350 17 GRAM(S): 17 POWDER, FOR SOLUTION ORAL at 12:07

## 2022-10-31 RX ADMIN — OXYCODONE HYDROCHLORIDE 5 MILLIGRAM(S): 5 TABLET ORAL at 16:07

## 2022-10-31 RX ADMIN — Medication 975 MILLIGRAM(S): at 09:57

## 2022-10-31 RX ADMIN — Medication 600 MILLIGRAM(S): at 05:40

## 2022-10-31 RX ADMIN — OXYCODONE HYDROCHLORIDE 5 MILLIGRAM(S): 5 TABLET ORAL at 21:59

## 2022-10-31 RX ADMIN — Medication 975 MILLIGRAM(S): at 21:52

## 2022-10-31 RX ADMIN — OXYCODONE HYDROCHLORIDE 5 MILLIGRAM(S): 5 TABLET ORAL at 02:27

## 2022-10-31 RX ADMIN — Medication 600 MILLIGRAM(S): at 00:36

## 2022-10-31 RX ADMIN — MAGNESIUM HYDROXIDE 30 MILLILITER(S): 400 TABLET, CHEWABLE ORAL at 16:16

## 2022-10-31 RX ADMIN — SIMETHICONE 80 MILLIGRAM(S): 80 TABLET, CHEWABLE ORAL at 02:53

## 2022-10-31 RX ADMIN — Medication 975 MILLIGRAM(S): at 09:17

## 2022-10-31 RX ADMIN — Medication 600 MILLIGRAM(S): at 13:12

## 2022-10-31 RX ADMIN — OXYCODONE HYDROCHLORIDE 5 MILLIGRAM(S): 5 TABLET ORAL at 22:45

## 2022-10-31 RX ADMIN — HEPARIN SODIUM 5000 UNIT(S): 5000 INJECTION INTRAVENOUS; SUBCUTANEOUS at 17:21

## 2022-10-31 RX ADMIN — Medication 975 MILLIGRAM(S): at 17:05

## 2022-10-31 RX ADMIN — OXYCODONE HYDROCHLORIDE 5 MILLIGRAM(S): 5 TABLET ORAL at 17:20

## 2022-10-31 RX ADMIN — Medication 975 MILLIGRAM(S): at 22:45

## 2022-10-31 NOTE — PROGRESS NOTE ADULT - ASSESSMENT
A/P: 31yo POD#2 s/p LTCS.  Patient is stable and doing well post-operatively.      #PP  - Continue regular diet.  - Increase ambulation.  - Continue motrin, tylenol, oxycodone PRN for pain control.    #Serosanguinous drainage  - Drainage is improving with pressure dressing  - If drainage does not resolve in the AM, consider neurosurgery consult    Crystal Berkowitz MD  OB/GYN PGY-1

## 2022-10-31 NOTE — PROGRESS NOTE ADULT - ASSESSMENT
Patient seen at bedside resting comfortably offers no new complaints. + Ambulation, + void without difficulty, + flatus;  no bm; tolerating regular diet. both breastfeeding and bottle feeding. Denies HA, blurry vision or epigastric pain, CP, SOB, N/V/D,  dizziness, palpitations, worsening vaginal bleeding.    Vital Signs Last 24 Hrs  T(C): 37 (31 Oct 2022 10:06), Max: 37 (31 Oct 2022 10:06)  T(F): 98.6 (31 Oct 2022 10:06), Max: 98.6 (31 Oct 2022 10:06)  HR: 88 (31 Oct 2022 10:06) (72 - 91)  BP: 116/70 (31 Oct 2022 10:06) (110/66 - 121/83)  BP(mean): --  RR: 17 (31 Oct 2022 10:06) (17 - 19)  SpO2: 100% (31 Oct 2022 10:06) (99% - 100%)    Parameters below as of 31 Oct 2022 10:06  Patient On (Oxygen Delivery Method): room air        Gen: A&O x 3, NAD  Chest: CTABL  Cardiac: S1, S2, RRR  Breast: Soft, nontender, nonengorged  Abdomen: +BS; soft; Nontender, nondistended, Incision C/D/I steri strips in place   Gyn: Minimal lochia  Extremities: Nontender, DTRS 2+, no worsening edema                          10.2   16.44 )-----------( 146      ( 30 Oct 2022 05:40 )             31.1       A/P: 32 yr old F POD #2 s/p p/c/s on 10/29 for CAT II tracing.      -Pain management as needed  -cont post op care  -OOB and ambulate   -encourage insentive spirometer use  -Encourage breastfeeding     -d/w   Patient seen at bedside resting comfortably offers no new complaints. + Ambulation, + void without difficulty, + flatus;  no bm; tolerating regular diet. both breastfeeding and bottle feeding. Denies HA, blurry vision or epigastric pain, CP, SOB, N/V/D,  dizziness, palpitations, worsening vaginal bleeding.    Vital Signs Last 24 Hrs  T(C): 37 (31 Oct 2022 10:06), Max: 37 (31 Oct 2022 10:06)  T(F): 98.6 (31 Oct 2022 10:06), Max: 98.6 (31 Oct 2022 10:06)  HR: 88 (31 Oct 2022 10:06) (72 - 91)  BP: 116/70 (31 Oct 2022 10:06) (110/66 - 121/83)  BP(mean): --  RR: 17 (31 Oct 2022 10:06) (17 - 19)  SpO2: 100% (31 Oct 2022 10:06) (99% - 100%)    Parameters below as of 31 Oct 2022 10:06  Patient On (Oxygen Delivery Method): room air        Gen: A&O x 3, NAD  Chest: CTABL  Cardiac: S1, S2, RRR  Breast: Soft, nontender, nonengorged  Abdomen: +BS; soft; Nontender, nondistended, Incision C/D/I steri strips in place   Gyn: Minimal lochia  Extremities: Nontender, DTRS 2+, no worsening edema                          10.2   16.44 )-----------( 146      ( 30 Oct 2022 05:40 )             31.1       A/P: 32 yr old F POD #2 s/p p/c/s on 10/29 for CAT II tracing.      -Pain management as needed (motrin, Tylenol, and Oxycodone)  -cont post op care  -OOB and ambulate   -encourage insentive spirometer use  -Incision (dermabond dressing) C/D/I   -Encourage breastfeeding     # Serous drainage from epidural site   - Pt c/o of pain 8/10 tender to touch at site progressing to her shoulders.   - Anesthesia consulted, awaiting recs   - Pt denies headaches, blurry vision, or dizziness.    -d/w dr Jordi Douglas [facility]   [phone] Patient seen at bedside resting comfortably offers no new complaints. + Ambulation, + void without difficulty, + flatus;  no bm; tolerating regular diet. both breastfeeding and bottle feeding. Denies HA, blurry vision or epigastric pain, CP, SOB, N/V/D,  dizziness, palpitations, worsening vaginal bleeding.    Vital Signs Last 24 Hrs  T(C): 37 (31 Oct 2022 10:06), Max: 37 (31 Oct 2022 10:06)  T(F): 98.6 (31 Oct 2022 10:06), Max: 98.6 (31 Oct 2022 10:06)  HR: 88 (31 Oct 2022 10:06) (72 - 91)  BP: 116/70 (31 Oct 2022 10:06) (110/66 - 121/83)  BP(mean): --  RR: 17 (31 Oct 2022 10:06) (17 - 19)  SpO2: 100% (31 Oct 2022 10:06) (99% - 100%)    Parameters below as of 31 Oct 2022 10:06  Patient On (Oxygen Delivery Method): room air        Gen: A&O x 3, NAD  Chest: CTABL  Cardiac: S1, S2, RRR  Breast: Soft, nontender, nonengorged  Abdomen: +BS; soft; Nontender, nondistended, Incision C/D/I steri strips in place   Gyn: Minimal lochia  Extremities: Nontender, DTRS 2+, no worsening edema                          10.2   16.44 )-----------( 146      ( 30 Oct 2022 05:40 )             31.1       A/P: 32 yr old F POD #2 s/p p/c/s on 10/29 for CAT II tracing.      -Pain management as needed (motrin, Tylenol, and Oxycodone)  -cont post op care  -OOB and ambulate   -encourage insentive spirometer use  -Incision (dermabond dressing) C/D/I   -Encourage breastfeeding     # Serous drainage from epidural site   - Pt c/o of pain 8/10 tender to touch at site progressing to her shoulders.   - Anesthesia consulted, awaiting recs   - Pt denies headaches, blurry vision, or dizziness.    #constipation   - pt c/o of last bowel movement since 10/20  - Increase PO hydration   - OOB and ambulation   - Milk of magnesia PRN   - Senna PRN ordered.   - Fleet enema if no BM by end of day.     -d/w dr Jordi Douglas [facility]   [phone] Patient seen at bedside resting comfortably offers no new complaints. + Ambulation, + void without difficulty, + flatus;  no bm; tolerating regular diet. both breastfeeding and bottle feeding. Denies HA, blurry vision or epigastric pain, CP, SOB, N/V/D,  dizziness, palpitations, worsening vaginal bleeding.    Vital Signs Last 24 Hrs  T(C): 37 (31 Oct 2022 10:06), Max: 37 (31 Oct 2022 10:06)  T(F): 98.6 (31 Oct 2022 10:06), Max: 98.6 (31 Oct 2022 10:06)  HR: 88 (31 Oct 2022 10:06) (72 - 91)  BP: 116/70 (31 Oct 2022 10:06) (110/66 - 121/83)  BP(mean): --  RR: 17 (31 Oct 2022 10:06) (17 - 19)  SpO2: 100% (31 Oct 2022 10:06) (99% - 100%)    Parameters below as of 31 Oct 2022 10:06  Patient On (Oxygen Delivery Method): room air        Gen: A&O x 3, NAD  Chest: CTABL  Cardiac: S1, S2, RRR  Breast: Soft, nontender, nonengorged  Abdomen: +BS; soft; Nontender, nondistended, Incision C/D/I steri strips in place   Gyn: Minimal lochia  Extremities: Nontender, DTRS 2+, no worsening edema                          10.2   16.44 )-----------( 146      ( 30 Oct 2022 05:40 )             31.1       A/P: 32 yr old F POD #2 s/p p/c/s on 10/29 for CAT II tracing.      -Pain management as needed (motrin, Tylenol, and Oxycodone)  -cont post op care  -OOB and ambulate   -encourage insentive spirometer use  -Incision (dermabond dressing) C/D/I   -Encourage breastfeeding     # Serous drainage from epidural site   - Pt c/o of pain 8/10 tender to touch at site progressing to her shoulders.   - Anesthesia consulted, awaiting recs   - Pt denies headaches, blurry vision, or dizziness.    #constipation   - pt c/o of last bowel movement since 10/20  - Increase PO hydration   - OOB and ambulation   - Milk of magnesia PRN   - Senna PRN  - Miralax ordered   - Fleet enema if no BM by end of day.     -d/w dr Jordi Douglas [facility]   [phone]

## 2022-11-01 LAB
BASOPHILS # BLD AUTO: 0.07 K/UL — SIGNIFICANT CHANGE UP (ref 0–0.2)
BASOPHILS NFR BLD AUTO: 0.7 % — SIGNIFICANT CHANGE UP (ref 0–2)
EOSINOPHIL # BLD AUTO: 0.37 K/UL — SIGNIFICANT CHANGE UP (ref 0–0.5)
EOSINOPHIL NFR BLD AUTO: 3.8 % — SIGNIFICANT CHANGE UP (ref 0–6)
HCT VFR BLD CALC: 35.1 % — SIGNIFICANT CHANGE UP (ref 34.5–45)
HGB BLD-MCNC: 11 G/DL — LOW (ref 11.5–15.5)
IANC: 6.25 K/UL — SIGNIFICANT CHANGE UP (ref 1.8–7.4)
IMM GRANULOCYTES NFR BLD AUTO: 2.1 % — HIGH (ref 0–0.9)
LYMPHOCYTES # BLD AUTO: 2.19 K/UL — SIGNIFICANT CHANGE UP (ref 1–3.3)
LYMPHOCYTES # BLD AUTO: 22.5 % — SIGNIFICANT CHANGE UP (ref 13–44)
MCHC RBC-ENTMCNC: 26.5 PG — LOW (ref 27–34)
MCHC RBC-ENTMCNC: 31.3 GM/DL — LOW (ref 32–36)
MCV RBC AUTO: 84.6 FL — SIGNIFICANT CHANGE UP (ref 80–100)
MONOCYTES # BLD AUTO: 0.67 K/UL — SIGNIFICANT CHANGE UP (ref 0–0.9)
MONOCYTES NFR BLD AUTO: 6.9 % — SIGNIFICANT CHANGE UP (ref 2–14)
NEUTROPHILS # BLD AUTO: 6.25 K/UL — SIGNIFICANT CHANGE UP (ref 1.8–7.4)
NEUTROPHILS NFR BLD AUTO: 64 % — SIGNIFICANT CHANGE UP (ref 43–77)
NRBC # BLD: 0 /100 WBCS — SIGNIFICANT CHANGE UP (ref 0–0)
NRBC # FLD: 0 K/UL — SIGNIFICANT CHANGE UP (ref 0–0)
PLATELET # BLD AUTO: 197 K/UL — SIGNIFICANT CHANGE UP (ref 150–400)
RBC # BLD: 4.15 M/UL — SIGNIFICANT CHANGE UP (ref 3.8–5.2)
RBC # FLD: 21.4 % — HIGH (ref 10.3–14.5)
WBC # BLD: 9.75 K/UL — SIGNIFICANT CHANGE UP (ref 3.8–10.5)
WBC # FLD AUTO: 9.75 K/UL — SIGNIFICANT CHANGE UP (ref 3.8–10.5)

## 2022-11-01 RX ORDER — FERROUS SULFATE 325(65) MG
325 TABLET ORAL DAILY
Refills: 0 | Status: DISCONTINUED | OUTPATIENT
Start: 2022-11-01 | End: 2022-11-02

## 2022-11-01 RX ADMIN — Medication 600 MILLIGRAM(S): at 18:12

## 2022-11-01 RX ADMIN — Medication 600 MILLIGRAM(S): at 00:33

## 2022-11-01 RX ADMIN — Medication 600 MILLIGRAM(S): at 23:51

## 2022-11-01 RX ADMIN — Medication 600 MILLIGRAM(S): at 23:21

## 2022-11-01 RX ADMIN — Medication 600 MILLIGRAM(S): at 12:29

## 2022-11-01 RX ADMIN — Medication 975 MILLIGRAM(S): at 09:30

## 2022-11-01 RX ADMIN — OXYCODONE HYDROCHLORIDE 5 MILLIGRAM(S): 5 TABLET ORAL at 02:43

## 2022-11-01 RX ADMIN — Medication 975 MILLIGRAM(S): at 21:36

## 2022-11-01 RX ADMIN — HEPARIN SODIUM 5000 UNIT(S): 5000 INJECTION INTRAVENOUS; SUBCUTANEOUS at 18:12

## 2022-11-01 RX ADMIN — OXYCODONE HYDROCHLORIDE 5 MILLIGRAM(S): 5 TABLET ORAL at 03:35

## 2022-11-01 RX ADMIN — Medication 600 MILLIGRAM(S): at 01:20

## 2022-11-01 RX ADMIN — Medication 975 MILLIGRAM(S): at 22:06

## 2022-11-01 RX ADMIN — Medication 600 MILLIGRAM(S): at 18:45

## 2022-11-01 RX ADMIN — POLYETHYLENE GLYCOL 3350 17 GRAM(S): 17 POWDER, FOR SOLUTION ORAL at 18:11

## 2022-11-01 RX ADMIN — Medication 975 MILLIGRAM(S): at 03:35

## 2022-11-01 RX ADMIN — Medication 600 MILLIGRAM(S): at 06:45

## 2022-11-01 RX ADMIN — Medication 975 MILLIGRAM(S): at 02:43

## 2022-11-01 RX ADMIN — HEPARIN SODIUM 5000 UNIT(S): 5000 INJECTION INTRAVENOUS; SUBCUTANEOUS at 05:58

## 2022-11-01 RX ADMIN — Medication 975 MILLIGRAM(S): at 16:12

## 2022-11-01 RX ADMIN — Medication 600 MILLIGRAM(S): at 05:57

## 2022-11-01 RX ADMIN — OXYCODONE HYDROCHLORIDE 5 MILLIGRAM(S): 5 TABLET ORAL at 09:31

## 2022-11-01 RX ADMIN — Medication 975 MILLIGRAM(S): at 10:00

## 2022-11-01 NOTE — PROGRESS NOTE ADULT - ASSESSMENT
A/P: 33yo POD#3 s/p LTCS.  Patient is stable and is doing well post-operatively.  #PP  - Continue motrin, tylenol, oxycodone PRN for pain control.  - Increase ambulation  - Continue regular diet  - Discharge planning    #Serosanguinous drainage  - Drainage is decreasing  - Anesthesia to follow    Crystal Berkowitz MD  OB/GYN PGY-1

## 2022-11-02 VITALS
DIASTOLIC BLOOD PRESSURE: 78 MMHG | TEMPERATURE: 98 F | RESPIRATION RATE: 18 BRPM | OXYGEN SATURATION: 99 % | SYSTOLIC BLOOD PRESSURE: 126 MMHG | HEART RATE: 89 BPM

## 2022-11-02 RX ORDER — ACETAMINOPHEN 500 MG
2 TABLET ORAL
Qty: 0 | Refills: 0 | DISCHARGE

## 2022-11-02 RX ORDER — IBUPROFEN 200 MG
1 TABLET ORAL
Qty: 0 | Refills: 0 | DISCHARGE

## 2022-11-02 RX ADMIN — OXYCODONE HYDROCHLORIDE 5 MILLIGRAM(S): 5 TABLET ORAL at 11:00

## 2022-11-02 RX ADMIN — Medication 975 MILLIGRAM(S): at 11:00

## 2022-11-02 RX ADMIN — Medication 975 MILLIGRAM(S): at 10:03

## 2022-11-02 RX ADMIN — Medication 600 MILLIGRAM(S): at 05:49

## 2022-11-02 RX ADMIN — OXYCODONE HYDROCHLORIDE 5 MILLIGRAM(S): 5 TABLET ORAL at 10:04

## 2022-11-02 RX ADMIN — Medication 600 MILLIGRAM(S): at 05:19

## 2022-11-02 RX ADMIN — HEPARIN SODIUM 5000 UNIT(S): 5000 INJECTION INTRAVENOUS; SUBCUTANEOUS at 05:19

## 2022-11-02 NOTE — PROGRESS NOTE ADULT - ASSESSMENT
Patient seen at bedside resting comfortably offers no new complaints. + Ambulation, + void without difficulty, + flatus;  no bm;  tolerating regular diet. both breastfeeding and bottle feeding. Denies HA, blurry vision or epigastric pain, CP, SOB, N/V/D,  dizziness, palpitations, worsening vaginal bleeding.     Vital Signs Last 24 Hrs  T(C): 36.5 (02 Nov 2022 06:29), Max: 37 (01 Nov 2022 14:46)  T(F): 97.7 (02 Nov 2022 06:29), Max: 98.6 (01 Nov 2022 14:46)  HR: 91 (02 Nov 2022 06:29) (75 - 91)  BP: 127/81 (02 Nov 2022 06:29) (112/64 - 127/81)  BP(mean): --  RR: 18 (02 Nov 2022 06:29) (16 - 18)  SpO2: 99% (02 Nov 2022 06:29) (99% - 100%)    Parameters below as of 01 Nov 2022 22:21  Patient On (Oxygen Delivery Method): room air        Gen: A&O x 3, NAD  Chest: CTA B/L  Cardiac: S1,S2  RRR  Breast: Soft, nontender, nonengorged  Abdomen: +BS; soft; Nontender, nondistended, Incision C/D/I Dermabond    Gyn: Minimal lochia  Extremities: Nontender, DTRS 2+, no worsening edema                          11.0   9.75  )-----------( 197      ( 01 Nov 2022 08:50 )             35.1       A/P: 32 yr old F POD #4 s/p p/c/s on 10/29 for CAT II tracing     -Discharge home today   -Discharge instructions provided   -Wound care instructions provided   -Dermabond dressing C/D/I   -No drainage from epidural site, dressing removed   -Epidural site is clean, dry, no tenderness or discoloration noted   -follow up in 1-2weeks in office for incision check  -d/w dr Christian Douglas [facility]  [phone]

## 2022-11-02 NOTE — PROGRESS NOTE ADULT - SUBJECTIVE AND OBJECTIVE BOX
Anesthesia progress note    Seen in follow-up for complain of leakage from epidural site yesterday.   Pt seen and examined today. Reports resolution of leakage. Denies headache/nausea.  Able to ambulate and void    Exam:  Epidural site unremarkable    Plan:  No anesthesia complications. Continue care per post partum service    Alexis Coughlin MD
Post-Operative Note, C/S  She is a  32y woman who is now post-operative day: 3    Subjective:  The patient feels well.  She is ambulating.   She is tolerating regular diet.  She denies nausea and vomiting; denies fever.  She is voiding.  Her pain is controlled; incisional pain is appropriate. She reports normal post - op pain.   She reports normal postpartum bleeding.  She is breastfeeding and bottle feeding.   She is bonding with baby.     Physical exam:    Vital Signs Last 24 Hrs  T(C): 36.8 (01 Nov 2022 06:12), Max: 36.9 (31 Oct 2022 13:52)  T(F): 98.2 (01 Nov 2022 06:12), Max: 98.4 (31 Oct 2022 13:52)  HR: 67 (01 Nov 2022 06:12) (67 - 80)  BP: 126/81 (01 Nov 2022 06:12) (106/75 - 126/81)  BP(mean): --  RR: 18 (01 Nov 2022 06:12) (16 - 18)  SpO2: 100% (01 Nov 2022 06:12) (100% - 100%)    Parameters below as of 01 Nov 2022 06:12  Patient On (Oxygen Delivery Method): room air        Gen: NAD  Breast: Soft, nontender, not engorged.  Abdomen: Soft, nontender, no distension , firm uterine fundus at umbilicus.  Incision: C/D/I.  Pelvic: Normal lochia noted  Ext: No calf tenderness  No drainage noted from epidural site.     LABS:                        11.0   9.75  )-----------( 197      ( 01 Nov 2022 08:50 )             35.1       Rubella status:     Allergies    No Known Allergies    Intolerances      MEDICATIONS  (STANDING):  acetaminophen     Tablet .. 975 milliGRAM(s) Oral <User Schedule>  diphtheria/tetanus/pertussis (acellular) Vaccine (ADAcel) 0.5 milliLiter(s) IntraMuscular once  heparin   Injectable 5000 Unit(s) SubCutaneous every 12 hours  ibuprofen  Tablet. 600 milliGRAM(s) Oral every 6 hours  polyethylene glycol 3350 17 Gram(s) Oral daily  senna 2 Tablet(s) Oral at bedtime    MEDICATIONS  (PRN):  dexAMETHasone  Injectable 4 milliGRAM(s) IV Push every 6 hours PRN Nausea  diphenhydrAMINE 25 milliGRAM(s) Oral every 6 hours PRN Pruritus  lanolin Ointment 1 Application(s) Topical every 6 hours PRN Sore Nipples  magnesium hydroxide Suspension 30 milliLiter(s) Oral two times a day PRN Constipation  naloxone Injectable 0.1 milliGRAM(s) IV Push every 3 minutes PRN For ANY of the following changes in patient status:  A. Breaths Per Minute LESS THAN 10, B. Oxygen saturation LESS THAN 90%, C. Sedation score of 6 for Stop After: 4 Times  ondansetron Injectable 4 milliGRAM(s) IV Push every 6 hours PRN Nausea  oxyCODONE    IR 5 milliGRAM(s) Oral every 3 hours PRN Moderate to Severe Pain (4-10)  oxyCODONE    IR 5 milliGRAM(s) Oral once PRN Moderate to Severe Pain (4-10)  simethicone 80 milliGRAM(s) Chew every 4 hours PRN Gas        Assessment and Plan  POD # 3 s/p C/S. + Epidural site drainage, decreasing.     Doing well.  Encourage ambulation.  Incisional care/ wound care reviewed.   PO instructions reviewed.  PP Education materials provided.   Follow up anesthesia.   Plan for D/C tomorrow.  Plan reviewed with Dr. Bacon.         
R1 Progress Note    Patient seen and examined at bedside, no acute overnight events. No acute complaints, pain well controlled. Patient is ambulating and tolerating regular diet. Has not yet passed flatus or had a bowel movement. Garcia removed overnight and patient is due to void. Denies CP, SOB, N/V, HA, blurred vision, epigastric pain. Bleeding minimal. Patient is complaining of pain at the site of her epidural, and reports a serosanguinous fluid draining from the site and staining the pad behind her.    Vital Signs Last 24 Hours  T(C): 36.7 (10-30-22 @ 01:19), Max: 37.2 (10-29-22 @ 10:06)  HR: 88 (10-30-22 @ 01:19) (64 - 110)  BP: 94/56 (10-30-22 @ 01:19) (91/51 - 161/79)  RR: 18 (10-30-22 @ 01:19) (10 - 24)  SpO2: 97% (10-30-22 @ 01:19) (71% - 100%)    I&O's Summary    28 Oct 2022 07:01  -  29 Oct 2022 07:00  --------------------------------------------------------  IN: 3375 mL / OUT: 1950 mL / NET: 1425 mL    29 Oct 2022 07:01  -  30 Oct 2022 04:44  --------------------------------------------------------  IN: 4000 mL / OUT: 3706 mL / NET: 294 mL        Physical Exam:  General: NAD  Abdomen: Soft, non-tender, non-distended, fundus firm  Incision: Pfannenstiel incision CDI, subcuticular suture closure  Skin: Yellow serosanguinous fluid draining from site of the epidural puncture  Pelvic: Lochia wnl    Labs:    Blood Type: B Positive  Antibody Screen: Negative  RPR: Negative               12.8   20.29 )-----------( 173      ( 10-29 @ 15:20 )             39.8                12.1   9.57  )-----------( 178      ( 10-28 @ 01:00 )             37.6         MEDICATIONS  (STANDING):  acetaminophen     Tablet .. 975 milliGRAM(s) Oral <User Schedule>  diphtheria/tetanus/pertussis (acellular) Vaccine (ADAcel) 0.5 milliLiter(s) IntraMuscular once  heparin   Injectable 5000 Unit(s) SubCutaneous every 12 hours  ibuprofen  Tablet. 600 milliGRAM(s) Oral every 6 hours  ketorolac   Injectable 30 milliGRAM(s) IV Push every 6 hours  lactated ringers. 1000 milliLiter(s) (75 mL/Hr) IV Continuous <Continuous>  morphine PF Epidural 2 milliGRAM(s) Epidural once  oxytocin Infusion 333.333 milliUNIT(s)/Min (1000 mL/Hr) IV Continuous <Continuous>  oxytocin Infusion 333.333 milliUNIT(s)/Min (1000 mL/Hr) IV Continuous <Continuous>    MEDICATIONS  (PRN):  dexAMETHasone  Injectable 4 milliGRAM(s) IV Push every 6 hours PRN Nausea  diphenhydrAMINE 25 milliGRAM(s) Oral every 6 hours PRN Pruritus  lanolin Ointment 1 Application(s) Topical every 6 hours PRN Sore Nipples  magnesium hydroxide Suspension 30 milliLiter(s) Oral two times a day PRN Constipation  naloxone Injectable 0.1 milliGRAM(s) IV Push every 3 minutes PRN For ANY of the following changes in patient status:  A. Breaths Per Minute LESS THAN 10, B. Oxygen saturation LESS THAN 90%, C. Sedation score of 6 for Stop After: 4 Times  ondansetron Injectable 4 milliGRAM(s) IV Push every 6 hours PRN Nausea  oxyCODONE    IR 5 milliGRAM(s) Oral every 3 hours PRN Moderate to Severe Pain (4-10)  oxyCODONE    IR 5 milliGRAM(s) Oral once PRN Moderate to Severe Pain (4-10)  simethicone 80 milliGRAM(s) Chew every 4 hours PRN Gas  
called to evaluate patient for "leaking at epidural site".  patient is POD # 1 s/p .   she required a 2nd epidural as her first was pulled out accidently.   She endorsed overnight she had clear and bloody fluid leaking from epidural site as well as back pain.   She has been ambulating with assistance.   She denies fever, numbness/paresthesias/weakness in legs, bladder/bowel incontinence.   Her back pain has been stable.    upon inspection, her back has no erythema with no visible leaking from epidural site.   Epidural site is tender to palpation.   upon expressing the epidural site there is minimal leaking of clear to slight blood tinged fluid.   No purulent drainage.   Lower extremity strength and sensation intact.     Discussed with patient warning sites to look out for which should require immediate intervention including but not limited to fever, bladder/bowel incontinence, new purulent drainage, erythema of back; new loss of strength/sensation.    please call back Anesthesia as needed.   
Called to follow-up on this patient who has had some drainage from her epidural site.    On my arrival, the patient is sitting comfortably in a chair.  On examination, there was a dressing on her back and it appeared somewhat soaked by drainage.   However, there was no drainage from the epidural site over a 10 minute period and none upon expression.    Her physical exam was unremarkable.   Denies photophobia or tinnitus.  The patient does describe some upper (thoracic) back pain.    Impression:   Probable residual local anesthetic draining via the epidural site.   Now seems to have subsided.  The upper back discomfort may represent musculoskeletal pain from an extended labor (36 hours) and recuperation from  delivery.    Discussed with both patient and sig other as well as nursing staff and attending physician.    Will follow.  Please call if the patient's status changes.    G. Palleschi, MD
OB Postpartum Note:  Delivery, POD#3    S: 33yo POD#3 s/p LTCS. The drainage from the epidural site has decreased to about one or two drops. She is tolerating a regular diet and passing flatus. She is voiding spontaneously, and ambulating without difficulty. Denies CP/SOB. Denies lightheadedness/dizziness. Denies N/V.     O:  Vitals:  Vital Signs Last 24 Hrs  T(C): 36.4 (31 Oct 2022 18:38), Max: 37 (31 Oct 2022 10:06)  T(F): 97.5 (31 Oct 2022 18:38), Max: 98.6 (31 Oct 2022 10:06)  HR: 74 (31 Oct 2022 18:38) (74 - 88)  BP: 106/75 (31 Oct 2022 18:38) (106/75 - 117/71)  BP(mean): --  RR: 16 (31 Oct 2022 18:38) (16 - 18)  SpO2: 100% (31 Oct 2022 18:38) (100% - 100%)    Parameters below as of 31 Oct 2022 18:38  Patient On (Oxygen Delivery Method): room air        MEDICATIONS  (STANDING):  acetaminophen     Tablet .. 975 milliGRAM(s) Oral <User Schedule>  diphtheria/tetanus/pertussis (acellular) Vaccine (ADAcel) 0.5 milliLiter(s) IntraMuscular once  heparin   Injectable 5000 Unit(s) SubCutaneous every 12 hours  ibuprofen  Tablet. 600 milliGRAM(s) Oral every 6 hours  polyethylene glycol 3350 17 Gram(s) Oral daily  senna 2 Tablet(s) Oral at bedtime    MEDICATIONS  (PRN):  dexAMETHasone  Injectable 4 milliGRAM(s) IV Push every 6 hours PRN Nausea  diphenhydrAMINE 25 milliGRAM(s) Oral every 6 hours PRN Pruritus  lanolin Ointment 1 Application(s) Topical every 6 hours PRN Sore Nipples  magnesium hydroxide Suspension 30 milliLiter(s) Oral two times a day PRN Constipation  naloxone Injectable 0.1 milliGRAM(s) IV Push every 3 minutes PRN For ANY of the following changes in patient status:  A. Breaths Per Minute LESS THAN 10, B. Oxygen saturation LESS THAN 90%, C. Sedation score of 6 for Stop After: 4 Times  ondansetron Injectable 4 milliGRAM(s) IV Push every 6 hours PRN Nausea  oxyCODONE    IR 5 milliGRAM(s) Oral every 3 hours PRN Moderate to Severe Pain (4-10)  oxyCODONE    IR 5 milliGRAM(s) Oral once PRN Moderate to Severe Pain (4-10)  simethicone 80 milliGRAM(s) Chew every 4 hours PRN Gas      LABS:  Blood type: B Positive  Rubella IgG: RPR: Negative                          10.2<L>   16.44<H> >-----------< 146<L>    ( 10-30 @ 05:40 )             31.1<L>                        12.8   20.29<H> >-----------< 173    ( 10-29 @ 15:20 )             39.8    10-29-22 @ 15:20      137  |  105  |  4<L>  ----------------------------<  93  4.0   |  22  |  0.47<L>        Ca    8.9      29 Oct 2022 15:20    TPro  5.8<L>  /  Alb  2.8<L>  /  TBili  0.4  /  DBili  x   /  AST  25  /  ALT  12  /  AlkPhos  171<H>  10-29-22 @ 15:20          Physical exam:  Gen: NAD  Abdomen: Soft, nontender, no distension , firm uterine fundus at umbilicus.  Incision: Clean, dry, and intact   Pelvic: Normal lochia noted  Ext: No calf tenderness          
OB Progress Note: LTCS, POD#2    S: 33yo POD#2 s/p LTCS. She complains of serosanguinous drainage from her epidural site. She had an episode of dizziness and blurry vision around midnight that has since resolved. She denies HA, loss of sensation, incontinence. She has moderate abdominal pain that is relived by oxycodone. She is tolerating a regular diet and passing flatus. She is voiding spontaneously, and ambulating without difficulty. Denies CP/SOB. Denies lightheadedness/dizziness. Denies N/V. Denies sxs od PEC: denies headache, visual changes, RUQ pain, respiratory distress    O:  Vitals:  Vital Signs Last 24 Hrs  T(C): 36.6 (31 Oct 2022 01:08), Max: 36.9 (30 Oct 2022 21:55)  T(F): 97.9 (31 Oct 2022 01:08), Max: 98.5 (30 Oct 2022 21:55)  HR: 77 (31 Oct 2022 01:08) (77 - 91)  BP: 111/70 (31 Oct 2022 01:08) (106/64 - 114/73)  BP(mean): --  RR: 18 (31 Oct 2022 01:08) (17 - 19)  SpO2: 100% (31 Oct 2022 01:08) (98% - 100%)    Parameters below as of 31 Oct 2022 01:08  Patient On (Oxygen Delivery Method): room air        MEDICATIONS  (STANDING):  acetaminophen     Tablet .. 975 milliGRAM(s) Oral <User Schedule>  diphtheria/tetanus/pertussis (acellular) Vaccine (ADAcel) 0.5 milliLiter(s) IntraMuscular once  heparin   Injectable 5000 Unit(s) SubCutaneous every 12 hours  ibuprofen  Tablet. 600 milliGRAM(s) Oral every 6 hours  lactated ringers. 1000 milliLiter(s) (75 mL/Hr) IV Continuous <Continuous>  morphine PF Epidural 2 milliGRAM(s) Epidural once  oxytocin Infusion 333.333 milliUNIT(s)/Min (1000 mL/Hr) IV Continuous <Continuous>  oxytocin Infusion 333.333 milliUNIT(s)/Min (1000 mL/Hr) IV Continuous <Continuous>  senna 2 Tablet(s) Oral at bedtime      MEDICATIONS  (PRN):  dexAMETHasone  Injectable 4 milliGRAM(s) IV Push every 6 hours PRN Nausea  diphenhydrAMINE 25 milliGRAM(s) Oral every 6 hours PRN Pruritus  lanolin Ointment 1 Application(s) Topical every 6 hours PRN Sore Nipples  magnesium hydroxide Suspension 30 milliLiter(s) Oral two times a day PRN Constipation  naloxone Injectable 0.1 milliGRAM(s) IV Push every 3 minutes PRN For ANY of the following changes in patient status:  A. Breaths Per Minute LESS THAN 10, B. Oxygen saturation LESS THAN 90%, C. Sedation score of 6 for Stop After: 4 Times  ondansetron Injectable 4 milliGRAM(s) IV Push every 6 hours PRN Nausea  oxyCODONE    IR 5 milliGRAM(s) Oral once PRN Moderate to Severe Pain (4-10)  oxyCODONE    IR 5 milliGRAM(s) Oral every 3 hours PRN Moderate to Severe Pain (4-10)  simethicone 80 milliGRAM(s) Chew every 4 hours PRN Gas      Labs:  Blood type: B Positive  Rubella IgG: RPR: Negative                          10.2<L>   16.44<H> >-----------< 146<L>    ( 10-30 @ 05:40 )             31.1<L>                        12.8   20.29<H> >-----------< 173    ( 10-29 @ 15:20 )             39.8    10-29-22 @ 15:20      137  |  105  |  4<L>  ----------------------------<  93  4.0   |  22  |  0.47<L>        Ca    8.9      29 Oct 2022 15:20    TPro  5.8<L>  /  Alb  2.8<L>  /  TBili  0.4  /  DBili  x   /  AST  25  /  ALT  12  /  AlkPhos  171<H>  10-29-22 @ 15:20          PE:  General: NAD  Abdomen: Soft, distended, tympanic to percussion, diffuse tenderness, incision c/d/i.  Extremities: No erythema, no pitting edema  Back: Pressure dressing clean and dry

## 2023-01-19 NOTE — ED PROVIDER NOTE - NSICDXNOPASTMEDICALHX_GEN_ALL_ED
<-- Click to add NO pertinent Past Medical History
Mother's informed choice

## 2024-02-22 NOTE — ED ADULT NURSE NOTE - CHIEF COMPLAINT QUOTE
110 Pt c/o vaginal bleeding. States she is ~8-9 weeks pregnant, had headache earlier today followed by lower abdominal cramping and vaginal bleeding this evening. No hx of miscarriage. Denies n/v/d. Denies PMHx

## 2024-03-27 NOTE — OB RN PATIENT PROFILE - FUNCTIONAL ASSESSMENT - BASIC MOBILITY 5.
Detail Level: Detailed Quality 226: Preventive Care And Screening: Tobacco Use: Screening And Cessation Intervention: Patient screened for tobacco use and is an ex/non-smoker Quality 130: Documentation Of Current Medications In The Medical Record: Current Medications Documented 4 = No assist / stand by assistance

## 2024-06-05 NOTE — DISCHARGE NOTE OB - WILL THE PATIENT ACCEPT THE PFIZER COVID-19 VACCINE IF ELIGIBLE AND IT IS AVAILABLE?
No PAST SURGICAL HISTORY:  H/O ileostomy     H/O lysis of adhesions     History of closure of ileostomy     S/P closure of ileostomy     S/P exploratory laparotomy     S/P small bowel resection     S/P total colectomy

## 2024-07-29 NOTE — ED ADULT NURSE NOTE - PATIENT ON (OXYGEN DELIVERY METHOD)
Dr. Fountain informed RN fax can be taken to lab as the order.     Fax taken to lab.   Lab PSR states she will call to schedule.     room air

## 2024-08-12 ENCOUNTER — EMERGENCY (EMERGENCY)
Facility: HOSPITAL | Age: 34
LOS: 1 days | Discharge: HOME | End: 2024-08-12
Attending: EMERGENCY MEDICINE | Admitting: EMERGENCY MEDICINE
Payer: COMMERCIAL

## 2024-08-12 VITALS
WEIGHT: 126.1 LBS | TEMPERATURE: 98 F | HEART RATE: 79 BPM | RESPIRATION RATE: 18 BRPM | OXYGEN SATURATION: 99 % | DIASTOLIC BLOOD PRESSURE: 73 MMHG | SYSTOLIC BLOOD PRESSURE: 116 MMHG

## 2024-08-12 DIAGNOSIS — Z98.890 OTHER SPECIFIED POSTPROCEDURAL STATES: Chronic | ICD-10-CM

## 2024-08-12 DIAGNOSIS — Z87.2 PERSONAL HISTORY OF DISEASES OF THE SKIN AND SUBCUTANEOUS TISSUE: Chronic | ICD-10-CM

## 2024-08-12 PROCEDURE — 99285 EMERGENCY DEPT VISIT HI MDM: CPT | Mod: 25

## 2024-08-12 PROCEDURE — 93010 ELECTROCARDIOGRAM REPORT: CPT

## 2024-08-12 RX ORDER — ACETAMINOPHEN 500 MG
975 TABLET ORAL ONCE
Refills: 0 | Status: COMPLETED | OUTPATIENT
Start: 2024-08-12 | End: 2024-08-12

## 2024-08-12 RX ORDER — KETOROLAC TROMETHAMINE 10 MG
15 TABLET ORAL ONCE
Refills: 0 | Status: DISCONTINUED | OUTPATIENT
Start: 2024-08-12 | End: 2024-08-12

## 2024-08-12 NOTE — ED PROVIDER NOTE - CLINICAL SUMMARY MEDICAL DECISION MAKING FREE TEXT BOX
34F denies pmh presents with left sided CP that started around 3:30pm today.  Pt was in the mall accompanying her daughter obtaining ear piercing.  Her daughter was crying, and patient started feeling CP left chest radiating to LUE.  She thought it was "acid", took a Pepcid at home, but having persistent pain.  +Pain is left anterior chest, pleuritic in nature, associated with SOB.  Denies fever/chills, n/v/d, recent travel, OCP use.    Exam:  - nad  - rrr  - ctab, +reproducible pain over left anterior chest wall  - abd soft ntnd    A/P  - pleuritic CP, suspect MSK, r/o PTX/PE/ACS  - cbc, cmp, trop, bnp, dimer, ekg, cxr, ucg  - will obtain CTA if dimer positive

## 2024-08-12 NOTE — ED PROVIDER NOTE - NSFOLLOWUPINSTRUCTIONS_ED_ALL_ED_FT
Recommend ibuprofen 400mg every 6 hours with food for pain.    Follow up with your primary care physician.

## 2024-08-12 NOTE — ED ADULT TRIAGE NOTE - CHIEF COMPLAINT QUOTE
Pt c/o L sided chest pain x1 day radiating to L arm associated with SOB. Went to urgent care and was sent to ED for further eval. Denies fevers/chills, abd pain, vomiting. Well appearing. No hx

## 2024-08-12 NOTE — ED PROVIDER NOTE - PATIENT PORTAL LINK FT
You can access the FollowMyHealth Patient Portal offered by [facility] by registering at the following website: [email or web address]. By joining [employer]’s FollowMyHealth portal, you will also be able to view your health information using other applications (apps) compatible with our system.

## 2024-08-13 LAB
ALBUMIN SERPL ELPH-MCNC: 4 G/DL — SIGNIFICANT CHANGE UP (ref 3.3–5)
ALP SERPL-CCNC: 85 U/L — SIGNIFICANT CHANGE UP (ref 40–120)
ALT FLD-CCNC: 11 U/L — SIGNIFICANT CHANGE UP (ref 4–33)
ANION GAP SERPL CALC-SCNC: 11 MMOL/L — SIGNIFICANT CHANGE UP (ref 7–14)
APTT BLD: 40.1 SEC — HIGH (ref 24.5–35.6)
AST SERPL-CCNC: 19 U/L — SIGNIFICANT CHANGE UP (ref 4–32)
BASOPHILS # BLD AUTO: 0.11 K/UL — SIGNIFICANT CHANGE UP (ref 0–0.2)
BASOPHILS NFR BLD AUTO: 1.1 % — SIGNIFICANT CHANGE UP (ref 0–2)
BILIRUB SERPL-MCNC: <0.2 MG/DL — SIGNIFICANT CHANGE UP (ref 0.2–1.2)
BUN SERPL-MCNC: 12 MG/DL — SIGNIFICANT CHANGE UP (ref 7–23)
CALCIUM SERPL-MCNC: 9.2 MG/DL — SIGNIFICANT CHANGE UP (ref 8.4–10.5)
CHLORIDE SERPL-SCNC: 107 MMOL/L — SIGNIFICANT CHANGE UP (ref 98–107)
CO2 SERPL-SCNC: 21 MMOL/L — LOW (ref 22–31)
CREAT SERPL-MCNC: 0.85 MG/DL — SIGNIFICANT CHANGE UP (ref 0.5–1.3)
D DIMER BLD IA.RAPID-MCNC: <150 NG/ML DDU — SIGNIFICANT CHANGE UP
EGFR: 92 ML/MIN/1.73M2 — SIGNIFICANT CHANGE UP
EOSINOPHIL # BLD AUTO: 0.42 K/UL — SIGNIFICANT CHANGE UP (ref 0–0.5)
EOSINOPHIL NFR BLD AUTO: 4.3 % — SIGNIFICANT CHANGE UP (ref 0–6)
GLUCOSE SERPL-MCNC: 95 MG/DL — SIGNIFICANT CHANGE UP (ref 70–99)
HCG SERPL-ACNC: <1 MIU/ML — SIGNIFICANT CHANGE UP
HCT VFR BLD CALC: 38.4 % — SIGNIFICANT CHANGE UP (ref 34.5–45)
HGB BLD-MCNC: 12 G/DL — SIGNIFICANT CHANGE UP (ref 11.5–15.5)
IANC: 5.33 K/UL — SIGNIFICANT CHANGE UP (ref 1.8–7.4)
IMM GRANULOCYTES NFR BLD AUTO: 0.3 % — SIGNIFICANT CHANGE UP (ref 0–0.9)
INR BLD: 0.95 RATIO — SIGNIFICANT CHANGE UP (ref 0.85–1.18)
LYMPHOCYTES # BLD AUTO: 3.24 K/UL — SIGNIFICANT CHANGE UP (ref 1–3.3)
LYMPHOCYTES # BLD AUTO: 33.2 % — SIGNIFICANT CHANGE UP (ref 13–44)
MCHC RBC-ENTMCNC: 25.2 PG — LOW (ref 27–34)
MCHC RBC-ENTMCNC: 31.3 GM/DL — LOW (ref 32–36)
MCV RBC AUTO: 80.5 FL — SIGNIFICANT CHANGE UP (ref 80–100)
MONOCYTES # BLD AUTO: 0.63 K/UL — SIGNIFICANT CHANGE UP (ref 0–0.9)
MONOCYTES NFR BLD AUTO: 6.5 % — SIGNIFICANT CHANGE UP (ref 2–14)
NEUTROPHILS # BLD AUTO: 5.33 K/UL — SIGNIFICANT CHANGE UP (ref 1.8–7.4)
NEUTROPHILS NFR BLD AUTO: 54.6 % — SIGNIFICANT CHANGE UP (ref 43–77)
NRBC # BLD: 0 /100 WBCS — SIGNIFICANT CHANGE UP (ref 0–0)
NRBC # FLD: 0 K/UL — SIGNIFICANT CHANGE UP (ref 0–0)
NT-PROBNP SERPL-SCNC: 151 PG/ML — SIGNIFICANT CHANGE UP
PLATELET # BLD AUTO: 296 K/UL — SIGNIFICANT CHANGE UP (ref 150–400)
POTASSIUM SERPL-MCNC: 3.7 MMOL/L — SIGNIFICANT CHANGE UP (ref 3.5–5.3)
POTASSIUM SERPL-SCNC: 3.7 MMOL/L — SIGNIFICANT CHANGE UP (ref 3.5–5.3)
PROT SERPL-MCNC: 7.5 G/DL — SIGNIFICANT CHANGE UP (ref 6–8.3)
PROTHROM AB SERPL-ACNC: 10.7 SEC — SIGNIFICANT CHANGE UP (ref 9.5–13)
RBC # BLD: 4.77 M/UL — SIGNIFICANT CHANGE UP (ref 3.8–5.2)
RBC # FLD: 15.8 % — HIGH (ref 10.3–14.5)
SODIUM SERPL-SCNC: 139 MMOL/L — SIGNIFICANT CHANGE UP (ref 135–145)
TROPONIN T, HIGH SENSITIVITY RESULT: <6 NG/L — SIGNIFICANT CHANGE UP
WBC # BLD: 9.76 K/UL — SIGNIFICANT CHANGE UP (ref 3.8–10.5)
WBC # FLD AUTO: 9.76 K/UL — SIGNIFICANT CHANGE UP (ref 3.8–10.5)

## 2024-08-13 PROCEDURE — 71046 X-RAY EXAM CHEST 2 VIEWS: CPT | Mod: 26

## 2024-08-13 RX ADMIN — Medication 975 MILLIGRAM(S): at 00:50

## 2024-08-13 RX ADMIN — Medication 15 MILLIGRAM(S): at 00:49

## 2024-08-13 NOTE — ED ADULT NURSE NOTE - OBJECTIVE STATEMENT
Charge RN: Pt received in FT. Pt A&Ox4, denies Hx presents to ED for chest pain that started today while she was at the mall with her dauhgter to get her svetlana ear pierced. States he daughter cried and then the pain started suddently. Denies sob, abd pain, n/v/d, fevers/chills. Respirations even and unlabored, no accessory muscle use. 20G to L arm, labs sent. Stretcher in lowest position, side rail up, call bell within reach. report given to FT rn.

## 2024-09-10 ENCOUNTER — NON-APPOINTMENT (OUTPATIENT)
Age: 34
End: 2024-09-10

## 2024-09-10 ENCOUNTER — APPOINTMENT (OUTPATIENT)
Dept: OPHTHALMOLOGY | Facility: CLINIC | Age: 34
End: 2024-09-10
Payer: COMMERCIAL

## 2024-09-10 PROCEDURE — 92083 EXTENDED VISUAL FIELD XM: CPT

## 2024-09-10 PROCEDURE — 99204 OFFICE O/P NEW MOD 45 MIN: CPT | Mod: 25

## 2024-10-01 NOTE — H&P PST ADULT - EYES
Pt arrival via EMS c/o of frequent falls. From home with wife. 3 falls today, has been using a walker more over the last week d/t him feeling more unsteady. Pt denies dizziness, per pt his legs buckle and collapse on him. Most recent fall 11am today.  Pt is on blood thinners. R knee lac actively bleeding / wrapped by EMS. BP en route 86/50 standing. . A&Ox4, pt denies hitting his head.     .  Past Medical History:   Diagnosis Date    Aortic valve replaced 2012    19 mm [facility] mechanical    Arthritis     ALL OVER    Atrial fibrillation  (CMD)     BPH (benign prostatic hyperplasia)     CAD (coronary artery disease) 2012    2 vessel CABG    Cardiomyopathy  (CMD)     Cataracts, bilateral     Cervical myelopathy  (CMD) 07/10/2015    Chronic diastolic heart failure  (CMD)     Claudication (CMD)     \"both legs get heavy feeling and tired/ cramping mainly  in right leg\"    Colon polyp 03/11/2022    dr ames, 6 tubular adenomas. recall 3 years    Colon polyp 05/08/2023    dr ames, tubular adenoma x4, recall 3 years    COPD (chronic obstructive pulmonary disease)  (CMD)     no oxygen    Difficulty swallowing solids 07/02/2017    coming for EGD    Diverticulosis 05/08/2023    Dr. Darling MONTELONGO (dyspnea on exertion)     Dyslipidemia     ED (erectile dysfunction)     Esophagitis 12/2014    Esophagitis, reflux 03/11/2022    Dr. Ames    Essential (primary) hypertension     Former smoker, stopped smoking in distant past     Gastritis 03/11/2022    Dr. Ames    Gastroesophageal reflux disease     Gout     Hallux limitus of right foot     Hemoptysis     High cholesterol     History of aortic valve stenosis     Hodgkin's disease in remission  (CMD) 1982    HODGKINS ([facility] Radiation treatments)    Hypersomnia     Hypothyroidism     hypothyroidism    Incisional hernia, incarcerated     Internal hemorrhoids 05/08/2023    Dr. Ames    Intervertebral cervical disc disorder with myelopathy, cervical region     Long term (current) use of  anticoagulants     Mitral valve insufficiency     Non-rheumatic mitral regurgitation     Non-rheumatic tricuspid valve insufficiency     Pacemaker 07/00/2008    st.marry single chamber [place] left chest    Plantar fasciitis     right foot    Pulmonary emphysema  (CMD)     Pulmonary hypertension  (CMD)     Sinus node dysfunction  (CMD)     Sleep apnea     BiPAP at night    SOB (shortness of breath) on exertion     has inhaler prn    Wears glasses 2018 '   EOMI; PERRL; no drainage or redness

## 2024-12-13 ENCOUNTER — APPOINTMENT (OUTPATIENT)
Dept: ANTEPARTUM | Facility: CLINIC | Age: 34
End: 2024-12-13
Payer: COMMERCIAL

## 2024-12-13 PROCEDURE — 76801 OB US < 14 WKS SINGLE FETUS: CPT

## 2024-12-13 PROCEDURE — 76813 OB US NUCHAL MEAS 1 GEST: CPT

## 2025-01-31 ENCOUNTER — APPOINTMENT (OUTPATIENT)
Dept: ANTEPARTUM | Facility: CLINIC | Age: 35
End: 2025-01-31
Payer: MEDICAID

## 2025-01-31 ENCOUNTER — ASOB RESULT (OUTPATIENT)
Age: 35
End: 2025-01-31

## 2025-01-31 PROCEDURE — 76811 OB US DETAILED SNGL FETUS: CPT

## 2025-02-14 ENCOUNTER — TRANSCRIPTION ENCOUNTER (OUTPATIENT)
Age: 35
End: 2025-02-14

## 2025-05-02 NOTE — OB NEONATOLOGY/PEDIATRICIAN DELIVERY SUMMARY - BABY A: APGAR 5 MIN RESP RATE, DELIVERY
(2) good, crying Procedure:  REPAIR RIGHT HERNIA INGUINAL, possible exploratory laparotomy, possible bowel resection (Right: Groin)    Relevant Problems   CARDIO   (+) Essential hypertension   (+) Internal hemorrhoids   (+) Other hyperlipidemia      ENDO   (+) Acquired hypothyroidism      GI/HEPATIC   (+) Malignant neoplasm of colon (HCC)        Physical Exam    Airway    Mallampati score: II  TM Distance: >3 FB  Neck ROM: full     Dental   No notable dental hx     Cardiovascular  Cardiovascular exam normal    Pulmonary  Pulmonary exam normal     Other Findings  post-pubertal.  Incarcerated hernia and SBO    Anesthesia Plan  ASA Score- 2 Emergent    Anesthesia Type- general with ASA Monitors.         Additional Monitors:     Airway Plan: ETT.           Plan Factors-Exercise tolerance (METS): >4 METS.    Chart reviewed.  Imaging results reviewed. Existing labs reviewed. Patient summary reviewed.    Patient is not a current smoker.      Obstructive sleep apnea risk education given perioperatively.        Induction- intravenous.    Postoperative Plan- Plan for postoperative opioid use.     Perioperative Resuscitation Plan - Level 1 - Full Code.       Informed Consent- Anesthetic plan and risks discussed with patient.  I personally reviewed this patient with the CRNA. Discussed and agreed on the Anesthesia Plan with the CRNA..      NPO Status:  No vitals data found for the desired time range.

## 2025-06-10 ENCOUNTER — OUTPATIENT (OUTPATIENT)
Dept: INPATIENT UNIT | Facility: HOSPITAL | Age: 35
LOS: 1 days | End: 2025-06-10
Payer: MEDICAID

## 2025-06-10 ENCOUNTER — APPOINTMENT (OUTPATIENT)
Dept: ANTEPARTUM | Facility: CLINIC | Age: 35
End: 2025-06-10
Payer: COMMERCIAL

## 2025-06-10 ENCOUNTER — ASOB RESULT (OUTPATIENT)
Age: 35
End: 2025-06-10

## 2025-06-10 VITALS
SYSTOLIC BLOOD PRESSURE: 106 MMHG | DIASTOLIC BLOOD PRESSURE: 56 MMHG | HEART RATE: 93 BPM | TEMPERATURE: 98 F | RESPIRATION RATE: 16 BRPM

## 2025-06-10 VITALS — DIASTOLIC BLOOD PRESSURE: 55 MMHG | HEART RATE: 82 BPM | SYSTOLIC BLOOD PRESSURE: 101 MMHG

## 2025-06-10 DIAGNOSIS — Z98.890 OTHER SPECIFIED POSTPROCEDURAL STATES: Chronic | ICD-10-CM

## 2025-06-10 DIAGNOSIS — O26.899 OTHER SPECIFIED PREGNANCY RELATED CONDITIONS, UNSPECIFIED TRIMESTER: ICD-10-CM

## 2025-06-10 DIAGNOSIS — Z98.891 HISTORY OF UTERINE SCAR FROM PREVIOUS SURGERY: Chronic | ICD-10-CM

## 2025-06-10 DIAGNOSIS — Z87.2 PERSONAL HISTORY OF DISEASES OF THE SKIN AND SUBCUTANEOUS TISSUE: Chronic | ICD-10-CM

## 2025-06-10 PROCEDURE — 59025 FETAL NON-STRESS TEST: CPT | Mod: 26

## 2025-06-10 PROCEDURE — 99221 1ST HOSP IP/OBS SF/LOW 40: CPT | Mod: 25

## 2025-06-10 PROCEDURE — 76819 FETAL BIOPHYS PROFIL W/O NST: CPT | Mod: 26

## 2025-06-10 NOTE — OB PROVIDER TRIAGE NOTE - ADDITIONAL INSTRUCTIONS
No evidence of acute process  Normal fetal testing for 39 weeks  Return to triage for DFM, vaginal bleeding, leakage of fluid, painful contractions  Increase oral hydration  Dw MD Loiaza, cleared for discharge @[id number]  Follow up with OB office within one week

## 2025-06-10 NOTE — OB RN TRIAGE NOTE - NSICDXPASTSURGICALHX_GEN_ALL_CORE_FT
PAST SURGICAL HISTORY:  H/O  section     H/O cyst of breast  - cyst removed on both breast    History of D&C

## 2025-06-10 NOTE — OB PROVIDER TRIAGE NOTE - HISTORY OF PRESENT ILLNESS
34yo  @39+2 sent from OB office following decel noted on NST in OB office. Endorses contractions in irregular pattern since this AM. Patient denies leaking of fluid, vaginal bleeding, SOB, chest pain, fever, chills, recent illness. Endorses +FM.    PNC per P    Prenatal Course:  -Prior CS  (arrest at 5cm, CatII)  -Per patient EFW estimated 8lb    All: NKDA  Meds: PNV, vitD, pepcid 34yo  @39+2 sent from OB office following decel noted on NST in OB office. Endorses contractions in irregular pattern since this AM. Patient denies leaking of fluid, vaginal bleeding, SOB, chest pain, fever, chills, recent illness. Endorses +FM.    PNC per [facility]    Prenatal Course:  -Prior CS  (arrest at 5cm, CatII)  -Anemia s/p iron transfusion x1  -Per patient EFW estimated 8lb    All: NKDA  Meds: PNV, vitD, pepcid

## 2025-06-10 NOTE — OB PROVIDER TRIAGE NOTE - PLAN OF CARE
No evidence of acute process  Normal fetal testing for 39 weeks  Return to triage for DFM, vaginal bleeding, leakage of fluid, painful contractions  Increase oral hydration  Dw MD Loaiza, cleared for discharge @5147  Follow up with OB office within one week

## 2025-06-10 NOTE — OB RN TRIAGE NOTE - NS_GBS_INFANT_INVASIVE_OBGYN_ALL_OB_FT
Massage Therapy Progress Note  Integrative Therapy Outcomes    Treatment Provided    Questions Answered By: Patient    Integrative Therapy Provided: Massage Therapy  (Select the primary treatment provided)    Setting: Oncology Clinic  (Outpatient includes: all hospital and clinic-based outpatient)    Primary Complaint/Reason for Treatment: Wellness    Secondary Complaint/Reason for Treatment:: Fatigue      Pre-Treatment Assessment    Pain: 0  (Please rate your current pain on a scale of 0-10 (0 is no pain, 10 is the worst pain))    Stress: 3  (Please rate your current stress on a scale of 0-10 (0 is no stress, 10 is the worst stress))    Neuropathy: 2  (Please rate your current neuropathy (numbness/tingling) on a scale of 0-10 (0 is no numbness/tingling, 10 is worst numbness/tingling))    Nausea: 0  (Please rate your current nausea on a scale 0-10 (0 is no nausea, 10 is worst nausea))    Post-Treatment Assessment    Pain: 0  (What is your pain level after your treatment? Please rate it on a scale of 0-10 (0 is no pain, 10 is the worst pain))    Stress: 1  What is your stress level after your treatment? Please rate it on a scale of 0-10 (0 is no stress, 10 is the worst stress))    Neuropathy: 2  (What is your numbness or tingling level after your treatment?  Please rate it on a scale of 0-10 (0 is no numbness/tingling, 10 is the worst numbness/tingling))    Nausea: 0  (What is your nausea level after your treatment?  Please rate it on a scale of 0-10 (0 is no nausea, 10 is the worst nausea))      Length of treatment: 30-minute, FREE ONCOLOGY MASSAGE    Authorization: Patient request    Reviewed contraindications/current health status with Patient, Epic chart    No Contraindications to massage therapy exist and WBC 6.3     Subjective:  Patient complains of:  Stiffness/tension in  Neck, Shoulders and Upper back and Fatigue    Pain report prior to treatment:  0/10  Neuropathy prior to treatment:2/10  Nausea prior  to treatment:0/10  Stress prior to treatment:3/10    Type of treatment requested: Comfort massage, Wellness massage and Oncology massage    Specific requests:  Neck/shoulder/upper back     Objective Observations:  Hypertonicity noted in:  Cervical paraspinals Bilateral, Upper trapezius Bilateral and Levator scapula Bilateral    Hypotonicity/Ischemia noted in:none    Decreased ROM noted in No areas    Additional observations:  Pleasant affect and Talkative    Assessment:  Patient received Gentle comfort massage techniques to affected tissues.    Additional treatment provided: None at this time    Response to treatment: Patient gave positive verbal feedback and Patient relaxed easily    Pain report after treatment:0/10  Neuropathy after treatment:2/10  Nausea after treatment:0/10  Stress after treatment:1/10     Education/Resources: None provided this visit    Plan/Recommendations:  Session concluded   N/A

## 2025-06-10 NOTE — OB PROVIDER TRIAGE NOTE - NS_OBGYNHISTORY_OBGYN_ALL_OB_FT
C/S 10/29/2022 (arrest @5cm, cat II) 7lb  [facility] 2021, w D&C    GYN: tracy

## 2025-06-10 NOTE — OB PROVIDER TRIAGE NOTE - NSHPPHYSICALEXAM_GEN_ALL_CORE
T(C): 36.5 (06-10-25 @ 01:36), Max: 36.5 (06-10-25 @ 01:31)  HR: 96 (06-10-25 @ 01:36) (93 - 96)  BP: 111/66 (06-10-25 @ 01:36) (106/56 - 111/66)  RR: 16 (06-10-25 @ 01:36) (16 - 16)  SpO2: 100% (06-10-25 @ 01:36) (100% - 100%)    Physical Exam  Gen: NAD  Cardiac: RRR  Pulm: Unlabored, breathing comfortably on room air  Abdomen: soft, nontender, gravid    TAUS: cephalic, posterior placenta, LA NENA 10.7, BPP 8/8,  bpm via m-mode, images saved in ASOB  VE: 1/30/-3  NST  Baseline  ( 145 ) BPM  Variability (x  )  Moderate   (  ) Minimal  (  ) Absent  (  )  Marked  Accelerations ( x ) 15x15   (  ) 10x10  (  ) no  Decelerations ( x ) no  (  ) Variable  (  ) Early  (  ) Late    Contractions (  ) no  (x  ) yes     Description  irregular  Interpretation (x  ) reactive   (  )  non-reactive

## 2025-06-10 NOTE — OB PROVIDER TRIAGE NOTE - NSOBPROVIDERNOTE_OBGYN_ALL_OB_FT
35y  @39+2 seen in LD triage following decel on NST in OB office. On presentation c/o irregular cts. No leakage, VB. +FM. Prior CS  for arrest at 5cm, category II tracing. Was told 1cm in office on .    #r/o labor, TOLAC   VE /3 @  Sporadic contractions on toco    #decel on NST in OB office  cEFM x1.5 hour category I  BPP     No evidence of acute process  Normal fetal testing for ** weeks  Return to triage for DFM, vaginal bleeding, leakage of fluid, painful contractions  Increase oral hydration  Zhou WILLIS **, cleared for discharge @  Follow up with OB office on 35y  @39+2 seen in LD triage following decel on NST in OB office. On presentation c/o irregular cts. No leakage, VB. +FM. Prior CS  for arrest at 5cm, category II tracing. Was told 1cm in office on .    #r/o labor, TOLAC   VE /3   Sporadic contractions on toco    #decel on NST in OB office  cEFM x2 hour category I  BPP     No evidence of acute process  Normal fetal testing for 39 weeks  Return to triage for DFM, vaginal bleeding, leakage of fluid, painful contractions  Increase oral hydration  Dw MD Loaiza, cleared for discharge @3112  Follow up with OB office within one week

## 2025-06-10 NOTE — OB PROVIDER TRIAGE NOTE - YOU WERE IN THE HOSPITAL FOR:
No evidence of acute process  Normal fetal testing for 39 weeks  Return to triage for DFM, vaginal bleeding, leakage of fluid, painful contractions  Increase oral hydration  Dw MD Loaiza, cleared for discharge @4873  Follow up with OB office within one week

## 2025-06-11 ENCOUNTER — OUTPATIENT (OUTPATIENT)
Dept: OUTPATIENT SERVICES | Facility: HOSPITAL | Age: 35
LOS: 1 days | End: 2025-06-11

## 2025-06-11 VITALS
SYSTOLIC BLOOD PRESSURE: 104 MMHG | DIASTOLIC BLOOD PRESSURE: 77 MMHG | TEMPERATURE: 97 F | OXYGEN SATURATION: 99 % | HEART RATE: 104 BPM | RESPIRATION RATE: 14 BRPM | WEIGHT: 160.06 LBS | HEIGHT: 61 IN

## 2025-06-11 DIAGNOSIS — O34.211 MATERNAL CARE FOR LOW TRANSVERSE SCAR FROM PREVIOUS CESAREAN DELIVERY: ICD-10-CM

## 2025-06-11 DIAGNOSIS — Z98.890 OTHER SPECIFIED POSTPROCEDURAL STATES: Chronic | ICD-10-CM

## 2025-06-11 DIAGNOSIS — Z98.891 HISTORY OF UTERINE SCAR FROM PREVIOUS SURGERY: Chronic | ICD-10-CM

## 2025-06-11 DIAGNOSIS — Z87.2 PERSONAL HISTORY OF DISEASES OF THE SKIN AND SUBCUTANEOUS TISSUE: Chronic | ICD-10-CM

## 2025-06-11 DIAGNOSIS — Z98.891 HISTORY OF UTERINE SCAR FROM PREVIOUS SURGERY: ICD-10-CM

## 2025-06-11 PROBLEM — O99.019 ANEMIA COMPLICATING PREGNANCY, UNSPECIFIED TRIMESTER: Chronic | Status: ACTIVE | Noted: 2025-06-10

## 2025-06-11 LAB
APPEARANCE UR: ABNORMAL
BACTERIA # UR AUTO: ABNORMAL /HPF
BILIRUB UR-MCNC: NEGATIVE — SIGNIFICANT CHANGE UP
BLD GP AB SCN SERPL QL: NEGATIVE — SIGNIFICANT CHANGE UP
COLOR SPEC: YELLOW — SIGNIFICANT CHANGE UP
DIFF PNL FLD: NEGATIVE — SIGNIFICANT CHANGE UP
EPI CELLS # UR: 4 — SIGNIFICANT CHANGE UP
GLUCOSE UR QL: NEGATIVE MG/DL — SIGNIFICANT CHANGE UP
KETONES UR QL: NEGATIVE MG/DL — SIGNIFICANT CHANGE UP
LEUKOCYTE ESTERASE UR-ACNC: NEGATIVE — SIGNIFICANT CHANGE UP
NITRITE UR-MCNC: NEGATIVE — SIGNIFICANT CHANGE UP
PH UR: 6.5 — SIGNIFICANT CHANGE UP (ref 5–8)
PROT UR-MCNC: NEGATIVE MG/DL — SIGNIFICANT CHANGE UP
RBC CASTS # UR COMP ASSIST: 2 /HPF — SIGNIFICANT CHANGE UP (ref 0–4)
RH IG SCN BLD-IMP: POSITIVE — SIGNIFICANT CHANGE UP
SP GR SPEC: 1.01 — SIGNIFICANT CHANGE UP (ref 1–1.03)
UROBILINOGEN FLD QL: 0.2 MG/DL — SIGNIFICANT CHANGE UP (ref 0.2–1)
WBC UR QL: 4 /HPF — SIGNIFICANT CHANGE UP (ref 0–5)

## 2025-06-11 RX ORDER — SODIUM CHLORIDE 9 G/1000ML
1000 INJECTION, SOLUTION INTRAVENOUS
Refills: 0 | Status: DISCONTINUED | OUTPATIENT
Start: 2025-06-15 | End: 2025-06-16

## 2025-06-11 NOTE — OB PST NOTE - NSHPREVIEWOFSYSTEMS_GEN_ALL_CORE
General: No fever, chills, sweating, anorexia, weight gain. No polyphagia, polyurea, polydypsia, malaise, or fatigue    Skin: No rashes, itching, or dryness. No change in size/color of moles. No tumors, brittle nails, pitted nails, or hair loss    Breast: No tenderness, lumps, or nipple discharge      Ophthalmologic: No diplopia, photophobia, lacrimation, blurred Vision , or eye discharge    ENMT Symptoms: No hearing difficulty, ear pain, tinnitus, or vertigo. No sinus symptoms, nasal congestion, nasal   discharge, or nasal obstruction    Respiratory and Thorax: No wheezing, dyspnea, cough, hemoptysis, or pleuritic chest pPain     Cardiovascular: No chest pain, palpitations, dyspnea on exertion, orthopnea, paroxysmal nocturnal dyspnea,   peripheral edema, or claudication    Gastrointestinal: No nausea, vomiting, diarrhea, constipation, change in bowel habits, flatulence, abdominal pain, or melena    Genitourinary/ Pelvis: positive pregnancy reports good fetal movement, No hematuria, renal colic, or flank pain.  No urine discoloration, incontinence, dysuria, or urinary hesitancy. Normal urinary frequency. No nocturia, abnormal vaginal bleeding, vaginal discharge, spotting, pelvic pain, or vaginal leakage    Musculoskeletal: No arthralgia, arthritis, joint swelling, muscle cramping, muscle weakness, neck pain, arm pain, or leg pain    Neurological: No transient paralysis, weakness, paresthesias, or seizures. No syncope, tremors, vertigo, loss of sensation, difficulty walking, loss of consciousness, hemiparesis, confusion, or facial palsy    Psychiatric: No suicidal ideation, depression, anxiety, insomnia, memory loss, paranoia, mood swings, agitation, hallucinations, or hyperactivity    Hematology: denies hx of dvt, iron def  No gum bleeding, nose bleeding, or skin lumps    Lymphatic: No enlarged or tender lymph nodes. No extremity swelling    Endocrine: No heat or cold intolerance    Immunologic: No recurrent or persistent infections

## 2025-06-11 NOTE — OB PST NOTE - HISTORY OF PRESENT ILLNESS
34y/o female scheduled for repeat  section on 6/15/2025.  Pt hx of c- section reports good fetal movement.  Dx with Iron def anemia 1 month ago, received 2 iron infusions, [facility] hematology Ronny Sly.

## 2025-06-11 NOTE — OB PST NOTE - PROBLEM SELECTOR PLAN 1
Pt scheduled for repeat  section on 6/15/2025.  labs done results pending.  chlorhexidine provided-  written and verbal instructions given, with teach back, pt able to verbalize understanding.  Preop teaching done, pt able to verbalize understanding.   medication day of procedure as per ob/gyn

## 2025-06-12 DIAGNOSIS — D64.9 ANEMIA, UNSPECIFIED: ICD-10-CM

## 2025-06-12 DIAGNOSIS — O09.523 SUPERVISION OF ELDERLY MULTIGRAVIDA, THIRD TRIMESTER: ICD-10-CM

## 2025-06-12 DIAGNOSIS — Z3A.39 39 WEEKS GESTATION OF PREGNANCY: ICD-10-CM

## 2025-06-12 DIAGNOSIS — O09.293 SUPERVISION OF PREGNANCY WITH OTHER POOR REPRODUCTIVE OR OBSTETRIC HISTORY, THIRD TRIMESTER: ICD-10-CM

## 2025-06-12 DIAGNOSIS — O47.1 FALSE LABOR AT OR AFTER 37 COMPLETED WEEKS OF GESTATION: ICD-10-CM

## 2025-06-12 DIAGNOSIS — O36.8330 MATERNAL CARE FOR ABNORMALITIES OF THE FETAL HEART RATE OR RHYTHM, THIRD TRIMESTER, NOT APPLICABLE OR UNSPECIFIED: ICD-10-CM

## 2025-06-12 DIAGNOSIS — O34.219 MATERNAL CARE FOR UNSPECIFIED TYPE SCAR FROM PREVIOUS CESAREAN DELIVERY: ICD-10-CM

## 2025-06-12 DIAGNOSIS — O99.013 ANEMIA COMPLICATING PREGNANCY, THIRD TRIMESTER: ICD-10-CM

## 2025-06-12 LAB
ALBUMIN SERPL ELPH-MCNC: 3.4 G/DL — SIGNIFICANT CHANGE UP (ref 3.3–5)
ALP SERPL-CCNC: 257 U/L — HIGH (ref 40–120)
ALT FLD-CCNC: 25 U/L — SIGNIFICANT CHANGE UP (ref 10–40)
ANION GAP SERPL CALC-SCNC: 19 MMOL/L — HIGH (ref 5–17)
ANISOCYTOSIS BLD QL: SIGNIFICANT CHANGE UP
AST SERPL-CCNC: 39 U/L — HIGH (ref 10–35)
BASOPHILS # BLD AUTO: 0.09 K/UL — SIGNIFICANT CHANGE UP (ref 0–0.2)
BASOPHILS NFR BLD AUTO: 0.8 % — SIGNIFICANT CHANGE UP (ref 0–2)
BILIRUB SERPL-MCNC: 0.3 MG/DL — SIGNIFICANT CHANGE UP (ref 0.2–1.2)
BUN SERPL-MCNC: 9 MG/DL — SIGNIFICANT CHANGE UP (ref 7–23)
CALCIUM SERPL-MCNC: 9.4 MG/DL — SIGNIFICANT CHANGE UP (ref 8.4–10.5)
CHLORIDE SERPL-SCNC: 102 MMOL/L — SIGNIFICANT CHANGE UP (ref 96–108)
CO2 SERPL-SCNC: 15 MMOL/L — LOW (ref 22–31)
CREAT SERPL-MCNC: 0.56 MG/DL — SIGNIFICANT CHANGE UP (ref 0.5–1.3)
CRP SERPL-MCNC: 12 MG/L — HIGH
DACRYOCYTES BLD QL SMEAR: SLIGHT — SIGNIFICANT CHANGE UP
EGFR: 121 ML/MIN/1.73M2 — SIGNIFICANT CHANGE UP
EGFR: 121 ML/MIN/1.73M2 — SIGNIFICANT CHANGE UP
EOSINOPHIL # BLD AUTO: 0.24 K/UL — SIGNIFICANT CHANGE UP (ref 0–0.5)
EOSINOPHIL NFR BLD AUTO: 2.1 % — SIGNIFICANT CHANGE UP (ref 0–6)
FERRITIN SERPL-MCNC: 975 NG/ML — HIGH (ref 15–150)
GLUCOSE SERPL-MCNC: 80 MG/DL — SIGNIFICANT CHANGE UP (ref 70–99)
HCT VFR BLD CALC: 36.4 % — SIGNIFICANT CHANGE UP (ref 34.5–45)
HGB BLD-MCNC: 10.9 G/DL — LOW (ref 11.5–15.5)
HYPOCHROMIA BLD QL: SLIGHT — SIGNIFICANT CHANGE UP
IMM GRANULOCYTES NFR BLD AUTO: 1.8 % — HIGH (ref 0–0.9)
IRON SATN MFR SERPL: 750 UG/DL — HIGH (ref 30–160)
IRON SATN MFR SERPL: 82 % — HIGH (ref 14–50)
LYMPHOCYTES # BLD AUTO: 1.9 K/UL — SIGNIFICANT CHANGE UP (ref 1–3.3)
LYMPHOCYTES # BLD AUTO: 16.7 % — SIGNIFICANT CHANGE UP (ref 13–44)
MANUAL SMEAR VERIFICATION: SIGNIFICANT CHANGE UP
MCHC RBC-ENTMCNC: 22.7 PG — LOW (ref 27–34)
MCHC RBC-ENTMCNC: 29.9 G/DL — LOW (ref 32–36)
MCV RBC AUTO: 75.8 FL — LOW (ref 80–100)
MICROCYTES BLD QL: SLIGHT — SIGNIFICANT CHANGE UP
MONOCYTES # BLD AUTO: 0.81 K/UL — SIGNIFICANT CHANGE UP (ref 0–0.9)
MONOCYTES NFR BLD AUTO: 7.1 % — SIGNIFICANT CHANGE UP (ref 2–14)
NEUTROPHILS # BLD AUTO: 8.17 K/UL — HIGH (ref 1.8–7.4)
NEUTROPHILS NFR BLD AUTO: 71.5 % — SIGNIFICANT CHANGE UP (ref 43–77)
PLAT MORPH BLD: NORMAL — SIGNIFICANT CHANGE UP
PLATELET # BLD AUTO: 274 K/UL — SIGNIFICANT CHANGE UP (ref 150–400)
POIKILOCYTOSIS BLD QL AUTO: SLIGHT — SIGNIFICANT CHANGE UP
POLYCHROMASIA BLD QL SMEAR: SLIGHT — SIGNIFICANT CHANGE UP
POTASSIUM SERPL-MCNC: 4 MMOL/L — SIGNIFICANT CHANGE UP (ref 3.5–5.3)
POTASSIUM SERPL-SCNC: 4 MMOL/L — SIGNIFICANT CHANGE UP (ref 3.5–5.3)
PROT SERPL-MCNC: 6.5 G/DL — SIGNIFICANT CHANGE UP (ref 6–8.3)
RBC # BLD: 4.8 M/UL — SIGNIFICANT CHANGE UP (ref 3.8–5.2)
RBC # FLD: 26.3 % — HIGH (ref 10.3–14.5)
RBC BLD AUTO: ABNORMAL
RETICS #: 227.5 K/UL — HIGH (ref 25–125)
RETICS/RBC NFR: 4.7 % — HIGH (ref 0.5–2.5)
SCHISTOCYTES BLD QL AUTO: SLIGHT — SIGNIFICANT CHANGE UP
SODIUM SERPL-SCNC: 136 MMOL/L — SIGNIFICANT CHANGE UP (ref 135–145)
TIBC SERPL-MCNC: 914 UG/DL — HIGH (ref 220–430)
UIBC SERPL-MCNC: 163 UG/DL — SIGNIFICANT CHANGE UP (ref 110–370)
WBC # BLD: 11.41 K/UL — HIGH (ref 3.8–10.5)
WBC # FLD AUTO: 11.41 K/UL — HIGH (ref 3.8–10.5)

## 2025-06-13 NOTE — OB RN PATIENT PROFILE - NS_SOURCEOFINFO_OBGYN_ALL_OB
Chart review completed.  LOV 5/7/25.  Pt is diabetic and has PVD.      Pt calling for appointment Pt reports leg lesion (improved per patient).  He reports no soreness any more.  Last home care nurse visit 6/5/25.  Pt is being seen by home care 3 X's per week per notes, possible discharge from home care.      Pt now complaining of various skin issues like \"bug bites\" and is requesting an appointment.  Pt denies fevers, no feelings of illness. He reports one area feeling \"hard to touch\".  Pt declining ED or UC visit multiple times during conversation as next available appointment is not until Wednesday.  Pt scheduled with alternate provider.     Call to home care to discuss and left message.       Reason for Disposition   Nursing judgment    Protocols used: No Protocol [id number]     Patient

## 2025-06-15 ENCOUNTER — INPATIENT (INPATIENT)
Facility: HOSPITAL | Age: 35
LOS: 2 days | Discharge: HOME | End: 2025-06-18
Attending: STUDENT IN AN ORGANIZED HEALTH CARE EDUCATION/TRAINING PROGRAM | Admitting: STUDENT IN AN ORGANIZED HEALTH CARE EDUCATION/TRAINING PROGRAM
Payer: MEDICAID

## 2025-06-15 ENCOUNTER — TRANSCRIPTION ENCOUNTER (OUTPATIENT)
Age: 35
End: 2025-06-15

## 2025-06-15 VITALS — HEART RATE: 120 BPM | SYSTOLIC BLOOD PRESSURE: 126 MMHG | DIASTOLIC BLOOD PRESSURE: 73 MMHG

## 2025-06-15 DIAGNOSIS — Z98.891 HISTORY OF UTERINE SCAR FROM PREVIOUS SURGERY: Chronic | ICD-10-CM

## 2025-06-15 DIAGNOSIS — O34.211 MATERNAL CARE FOR LOW TRANSVERSE SCAR FROM PREVIOUS CESAREAN DELIVERY: ICD-10-CM

## 2025-06-15 DIAGNOSIS — Z98.890 OTHER SPECIFIED POSTPROCEDURAL STATES: Chronic | ICD-10-CM

## 2025-06-15 DIAGNOSIS — Z87.2 PERSONAL HISTORY OF DISEASES OF THE SKIN AND SUBCUTANEOUS TISSUE: Chronic | ICD-10-CM

## 2025-06-15 LAB
BASOPHILS # BLD AUTO: 0.07 K/UL — SIGNIFICANT CHANGE UP (ref 0–0.2)
BASOPHILS NFR BLD AUTO: 0.7 % — SIGNIFICANT CHANGE UP (ref 0–2)
BLD GP AB SCN SERPL QL: NEGATIVE — SIGNIFICANT CHANGE UP
EOSINOPHIL # BLD AUTO: 0.23 K/UL — SIGNIFICANT CHANGE UP (ref 0–0.5)
EOSINOPHIL NFR BLD AUTO: 2.1 % — SIGNIFICANT CHANGE UP (ref 0–6)
HCT VFR BLD CALC: 30.4 % — LOW (ref 34.5–45)
HCT VFR BLD CALC: 37.6 % — SIGNIFICANT CHANGE UP (ref 34.5–45)
HGB BLD-MCNC: 11.3 G/DL — LOW (ref 11.5–15.5)
HGB BLD-MCNC: 9.3 G/DL — LOW (ref 11.5–15.5)
IANC: 7.31 K/UL — SIGNIFICANT CHANGE UP (ref 1.8–7.4)
IMM GRANULOCYTES NFR BLD AUTO: 1.4 % — HIGH (ref 0–0.9)
LYMPHOCYTES # BLD AUTO: 19.6 % — SIGNIFICANT CHANGE UP (ref 13–44)
LYMPHOCYTES # BLD AUTO: 2.11 K/UL — SIGNIFICANT CHANGE UP (ref 1–3.3)
MCHC RBC-ENTMCNC: 23.3 PG — LOW (ref 27–34)
MCHC RBC-ENTMCNC: 23.8 PG — LOW (ref 27–34)
MCHC RBC-ENTMCNC: 30.1 G/DL — LOW (ref 32–36)
MCHC RBC-ENTMCNC: 30.6 G/DL — LOW (ref 32–36)
MCV RBC AUTO: 77.4 FL — LOW (ref 80–100)
MCV RBC AUTO: 77.9 FL — LOW (ref 80–100)
MONOCYTES # BLD AUTO: 0.88 K/UL — SIGNIFICANT CHANGE UP (ref 0–0.9)
MONOCYTES NFR BLD AUTO: 8.2 % — SIGNIFICANT CHANGE UP (ref 2–14)
NEUTROPHILS # BLD AUTO: 7.31 K/UL — SIGNIFICANT CHANGE UP (ref 1.8–7.4)
NEUTROPHILS NFR BLD AUTO: 68 % — SIGNIFICANT CHANGE UP (ref 43–77)
NRBC # BLD AUTO: 0.03 K/UL — HIGH (ref 0–0)
NRBC # BLD AUTO: 0.04 K/UL — HIGH (ref 0–0)
NRBC # FLD: 0.03 K/UL — HIGH (ref 0–0)
NRBC # FLD: 0.04 K/UL — HIGH (ref 0–0)
NRBC BLD AUTO-RTO: 0 /100 WBCS — SIGNIFICANT CHANGE UP (ref 0–0)
NRBC BLD AUTO-RTO: 0 /100 WBCS — SIGNIFICANT CHANGE UP (ref 0–0)
PLATELET # BLD AUTO: 187 K/UL — SIGNIFICANT CHANGE UP (ref 150–400)
PLATELET # BLD AUTO: 230 K/UL — SIGNIFICANT CHANGE UP (ref 150–400)
RBC # BLD: 3.9 M/UL — SIGNIFICANT CHANGE UP (ref 3.8–5.2)
RBC # BLD: 4.86 M/UL — SIGNIFICANT CHANGE UP (ref 3.8–5.2)
RBC # FLD: 28.7 % — HIGH (ref 10.3–14.5)
RBC # FLD: 29.2 % — HIGH (ref 10.3–14.5)
RH IG SCN BLD-IMP: POSITIVE — SIGNIFICANT CHANGE UP
WBC # BLD: 10.75 K/UL — HIGH (ref 3.8–10.5)
WBC # BLD: 15.89 K/UL — HIGH (ref 3.8–10.5)
WBC # FLD AUTO: 10.75 K/UL — HIGH (ref 3.8–10.5)
WBC # FLD AUTO: 15.89 K/UL — HIGH (ref 3.8–10.5)

## 2025-06-15 PROCEDURE — 93010 ELECTROCARDIOGRAM REPORT: CPT

## 2025-06-15 DEVICE — SURGICEL SNOW 2 X 4": Type: IMPLANTABLE DEVICE | Status: FUNCTIONAL

## 2025-06-15 RX ORDER — SODIUM CHLORIDE 9 G/1000ML
1000 INJECTION, SOLUTION INTRAVENOUS
Refills: 0 | Status: DISCONTINUED | OUTPATIENT
Start: 2025-06-15 | End: 2025-06-16

## 2025-06-15 RX ORDER — SIMETHICONE 80 MG
80 TABLET,CHEWABLE ORAL EVERY 4 HOURS
Refills: 0 | Status: DISCONTINUED | OUTPATIENT
Start: 2025-06-15 | End: 2025-06-18

## 2025-06-15 RX ORDER — KETOROLAC TROMETHAMINE 30 MG/ML
30 INJECTION, SOLUTION INTRAMUSCULAR; INTRAVENOUS EVERY 6 HOURS
Refills: 0 | Status: DISCONTINUED | OUTPATIENT
Start: 2025-06-15 | End: 2025-06-16

## 2025-06-15 RX ORDER — SODIUM CHLORIDE 9 G/1000ML
500 INJECTION, SOLUTION INTRAVENOUS ONCE
Refills: 0 | Status: COMPLETED | OUTPATIENT
Start: 2025-06-15 | End: 2025-06-15

## 2025-06-15 RX ORDER — HEPARIN SODIUM 1000 [USP'U]/ML
5000 INJECTION INTRAVENOUS; SUBCUTANEOUS EVERY 12 HOURS
Refills: 0 | Status: DISCONTINUED | OUTPATIENT
Start: 2025-06-15 | End: 2025-06-18

## 2025-06-15 RX ORDER — OXYCODONE HYDROCHLORIDE 30 MG/1
5 TABLET ORAL ONCE
Refills: 0 | Status: DISCONTINUED | OUTPATIENT
Start: 2025-06-15 | End: 2025-06-18

## 2025-06-15 RX ORDER — OXYTOCIN-SODIUM CHLORIDE 0.9% IV SOLN 30 UNIT/500ML 30-0.9/5 UT/ML-%
167 SOLUTION INTRAVENOUS
Qty: 30 | Refills: 0 | Status: DISCONTINUED | OUTPATIENT
Start: 2025-06-15 | End: 2025-06-16

## 2025-06-15 RX ORDER — MAGNESIUM HYDROXIDE 400 MG/5ML
30 SUSPENSION ORAL
Refills: 0 | Status: DISCONTINUED | OUTPATIENT
Start: 2025-06-15 | End: 2025-06-18

## 2025-06-15 RX ORDER — ACETAMINOPHEN 500 MG/5ML
1000 LIQUID (ML) ORAL ONCE
Refills: 0 | Status: COMPLETED | OUTPATIENT
Start: 2025-06-15 | End: 2025-06-15

## 2025-06-15 RX ORDER — IBUPROFEN 200 MG
600 TABLET ORAL EVERY 6 HOURS
Refills: 0 | Status: COMPLETED | OUTPATIENT
Start: 2025-06-15 | End: 2026-05-14

## 2025-06-15 RX ORDER — DIPHENHYDRAMINE HCL 12.5MG/5ML
25 ELIXIR ORAL ONCE
Refills: 0 | Status: COMPLETED | OUTPATIENT
Start: 2025-06-15 | End: 2025-06-15

## 2025-06-15 RX ORDER — OXYTOCIN-SODIUM CHLORIDE 0.9% IV SOLN 30 UNIT/500ML 30-0.9/5 UT/ML-%
42 SOLUTION INTRAVENOUS
Qty: 30 | Refills: 0 | Status: DISCONTINUED | OUTPATIENT
Start: 2025-06-15 | End: 2025-06-16

## 2025-06-15 RX ORDER — DIPHENHYDRAMINE HCL 12.5MG/5ML
25 ELIXIR ORAL EVERY 6 HOURS
Refills: 0 | Status: DISCONTINUED | OUTPATIENT
Start: 2025-06-15 | End: 2025-06-18

## 2025-06-15 RX ORDER — DIPHENOXYLATE HYDROCHLORIDE AND ATROPINE SULFATE .025; 2.5 MG/1; MG/1
1 TABLET ORAL ONCE
Refills: 0 | Status: DISCONTINUED | OUTPATIENT
Start: 2025-06-15 | End: 2025-06-15

## 2025-06-15 RX ORDER — MODIFIED LANOLIN 100 %
1 CREAM (GRAM) TOPICAL EVERY 6 HOURS
Refills: 0 | Status: DISCONTINUED | OUTPATIENT
Start: 2025-06-15 | End: 2025-06-18

## 2025-06-15 RX ORDER — OXYCODONE HYDROCHLORIDE 30 MG/1
5 TABLET ORAL
Refills: 0 | Status: COMPLETED | OUTPATIENT
Start: 2025-06-15 | End: 2025-06-22

## 2025-06-15 RX ORDER — ONDANSETRON HCL/PF 4 MG/2 ML
4 VIAL (ML) INJECTION ONCE
Refills: 0 | Status: COMPLETED | OUTPATIENT
Start: 2025-06-15 | End: 2025-06-15

## 2025-06-15 RX ORDER — CITRIC ACID/SODIUM CITRATE 300-500 MG
30 SOLUTION, ORAL ORAL ONCE
Refills: 0 | Status: COMPLETED | OUTPATIENT
Start: 2025-06-15 | End: 2025-06-15

## 2025-06-15 RX ORDER — ACETAMINOPHEN 500 MG/5ML
975 LIQUID (ML) ORAL
Refills: 0 | Status: DISCONTINUED | OUTPATIENT
Start: 2025-06-15 | End: 2025-06-18

## 2025-06-15 RX ADMIN — KETOROLAC TROMETHAMINE 30 MILLIGRAM(S): 30 INJECTION, SOLUTION INTRAMUSCULAR; INTRAVENOUS at 17:15

## 2025-06-15 RX ADMIN — Medication 25 MILLIGRAM(S): at 13:45

## 2025-06-15 RX ADMIN — SODIUM CHLORIDE 1000 MILLILITER(S): 9 INJECTION, SOLUTION INTRAVENOUS at 17:00

## 2025-06-15 RX ADMIN — SODIUM CHLORIDE 83 MILLILITER(S): 9 INJECTION, SOLUTION INTRAVENOUS at 17:59

## 2025-06-15 RX ADMIN — Medication 20 MILLIGRAM(S): at 08:52

## 2025-06-15 RX ADMIN — DIPHENOXYLATE HYDROCHLORIDE AND ATROPINE SULFATE 1 TABLET(S): .025; 2.5 TABLET ORAL at 11:05

## 2025-06-15 RX ADMIN — Medication 0.2 MILLIGRAM(S): at 18:09

## 2025-06-15 RX ADMIN — SODIUM CHLORIDE 200 MILLILITER(S): 9 INJECTION, SOLUTION INTRAVENOUS at 08:51

## 2025-06-15 RX ADMIN — Medication 0.2 MILLIGRAM(S): at 14:56

## 2025-06-15 RX ADMIN — KETOROLAC TROMETHAMINE 30 MILLIGRAM(S): 30 INJECTION, SOLUTION INTRAMUSCULAR; INTRAVENOUS at 23:53

## 2025-06-15 RX ADMIN — Medication 0.2 MILLIGRAM(S): at 22:00

## 2025-06-15 RX ADMIN — OXYTOCIN-SODIUM CHLORIDE 0.9% IV SOLN 30 UNIT/500ML 42 MILLIUNIT(S)/MIN: 30-0.9/5 SOLUTION at 13:35

## 2025-06-15 RX ADMIN — Medication 400 MILLIGRAM(S): at 14:53

## 2025-06-15 RX ADMIN — SODIUM CHLORIDE 500 MILLILITER(S): 9 INJECTION, SOLUTION INTRAVENOUS at 15:15

## 2025-06-15 RX ADMIN — Medication 1000 MILLIGRAM(S): at 15:15

## 2025-06-15 RX ADMIN — HEPARIN SODIUM 5000 UNIT(S): 1000 INJECTION INTRAVENOUS; SUBCUTANEOUS at 23:43

## 2025-06-15 RX ADMIN — OXYTOCIN-SODIUM CHLORIDE 0.9% IV SOLN 30 UNIT/500ML 42 MILLIUNIT(S)/MIN: 30-0.9/5 SOLUTION at 14:50

## 2025-06-15 RX ADMIN — KETOROLAC TROMETHAMINE 30 MILLIGRAM(S): 30 INJECTION, SOLUTION INTRAMUSCULAR; INTRAVENOUS at 17:19

## 2025-06-15 RX ADMIN — Medication 1 APPLICATION(S): at 08:52

## 2025-06-15 RX ADMIN — Medication 30 MILLILITER(S): at 08:52

## 2025-06-15 RX ADMIN — KETOROLAC TROMETHAMINE 30 MILLIGRAM(S): 30 INJECTION, SOLUTION INTRAMUSCULAR; INTRAVENOUS at 23:23

## 2025-06-15 NOTE — OB RN INTRAOPERATIVE NOTE - NSSELHIDDEN_OBGYN_ALL_OB_FT
[NS_DeliveryAttending1_OBGYN_ALL_OB_FT:[id number]=],[NS_DeliveryAssist1_OBGYN_ALL_OB_FT:[id number]=],[NS_DeliveryRN_OBGYN_ALL_OB_FT:[id number]==]

## 2025-06-15 NOTE — OB RN DELIVERY SUMMARY - NSSELHIDDEN_OBGYN_ALL_OB_FT
[NS_DeliveryAttending1_OBGYN_ALL_OB_FT:[id number]=],[NS_DeliveryAssist1_OBGYN_ALL_OB_FT:[id number]=],[NS_DeliveryRN_OBGYN_ALL_OB_FT:[id number]]

## 2025-06-15 NOTE — OB PROVIDER H&P - NS_CSECTION_OBGYN_ALL_OB_NU
I have reviewed the history, physical exam, assessment and management plans.  I concur with or have edited all elements of her note., Patient's visit was conducted through video telecommunication. Patient consented before the start of visit as to understanding of privacy concerns, possible technological failure, and their responsibility of carrying out instructions of plan.
1

## 2025-06-15 NOTE — OB PROVIDER DELIVERY SUMMARY - NSSELHIDDEN_OBGYN_ALL_OB_FT
[NS_DeliveryAttending1_OBGYN_ALL_OB_FT:[id number]=],[NS_DeliveryAssist1_OBGYN_ALL_OB_FT:[id number]=],[NS_DeliveryRN_OBGYN_ALL_OB_FT:[id number]==]

## 2025-06-15 NOTE — DISCHARGE NOTE OB - FINANCIAL ASSISTANCE
[facility] provides services at a reduced cost to those who are determined to be eligible through [facility]’s financial assistance program. Information regarding [facility]’s financial assistance program can be found by going to [email or web address] or by calling [phone].

## 2025-06-15 NOTE — DISCHARGE NOTE OB - PATIENT PORTAL LINK FT
You can access the FollowMyHealth Patient Portal offered by [facility] by registering at the following website: [email or web address]. By joining [facility]’s FollowMyHealth portal, you will also be able to view your health information using other applications (apps) compatible with our system.

## 2025-06-15 NOTE — OB PROVIDER H&P - NSHPPHYSICALEXAM_GEN_ALL_CORE
Objective  – VS  T(C): 36.6 (06-15-25 @ 07:30)  HR: 120 (06-15-25 @ 07:30)  BP: 126/73 (06-15-25 @ 07:30)  RR: 16 (06-15-25 @ 07:30)  SpO2: --    Physical Exam  CV: RRR  Pulm: breathing comfortably on RA  Abd: gravid, nontender  Extr: moving all extremities with ease    – FHT: baseline 155, mod variability, +accels, -decels  – [place]: irritability  – EFW: 4082g  – [place]: vertex

## 2025-06-15 NOTE — OB RN PATIENT PROFILE - NS_DIETPREFOTHER_OBGYN_ALL_OB_FT
"Pt was in the milieu.  She attended select groups. Pt played catch with staff in the song.  She is waiting to find out what will happen at court on Tuesday.  \"I really hope I can go after court but I guess they will want me to go to a ERT's or something.\"  Pt denies anx, dep, SI, SIB.  Appears restless, talked about wanting to go outside.   " halal

## 2025-06-15 NOTE — DISCHARGE NOTE OB - MEDICATION SUMMARY - MEDICATIONS TO STOP TAKING
Detail Level: Zone Plan: Patient to use over-the-counter moisturizers/emolients. Recommended products that contain salicylic acid or urea. Specifically suggested CERAVE SA, or EUCERIN Advanced Repair Lotion or Roughness Relief lotion, or AMLACTIN to be applied liberal onto arms/legs/trunk (but not on face) 1-2 times daily.\\nLIPIKAR AP+ by LaRoche Posey was also recommended\\n\\Vilma discussed the option of prescription AMMONIUM LACTATE 12% lotion to be applied twice daily onto the aforementioned body areas 1-2 times daily Render In Strict Bullet Format?: No Plan: Discussed topical chemotherapy/5-Fluorouracil (field or spot treatment) as an alternative treatment Initiate Treatment: UREA 40% QHS I will STOP taking the medications listed below when I get home from the hospital:  None

## 2025-06-15 NOTE — DISCHARGE NOTE OB - CARE PROVIDER_API CALL
Joann Gonzales  Obstetrics and Gynecology  [address] [place] [id number]  Phone: [phone]  Fax: [phone]  Follow Up Time: 1 week

## 2025-06-15 NOTE — OB PROVIDER DELIVERY SUMMARY - NSPROVIDERDELIVERYNOTE_OBGYN_ALL_OB_FT
scheduled rLTCS   Viable male infant   APGARS 9/9     3280g  Hysterotomy closed in 1 layer using caprosyn  Surgicell powder placed atop hysterotomy  Grossly normal uterus, tubes, and ovaries  Rectus muscle closed with interrupted mattress sutures.  Abdomen closed in standard fashion  Pt to recovery in stable condition    Uterine atony resolving with IM Methergine and IM hemabate.     EBL: 560cc  IVF: 1700cc  UOP: 230cc     Jeanine Mcdaniel, PGY-2 scheduled rLTCS   Viable male infant   APGARS 9/9     3280g  Hysterotomy closed in 1 layer using chromic   Surgicell powder placed atop hysterotomy  Grossly normal uterus, tubes, and ovaries  Rectus muscle closed with interrupted mattress sutures.  Abdomen closed in standard fashion  Pt to recovery in stable condition    Uterine atony resolving with IM Methergine and IM hemabate.     EBL: 560cc  IVF: 1700cc  UOP: 230cc     Jeanine Mcdaniel, PGY-2

## 2025-06-15 NOTE — OB PROVIDER H&P - ASSESSMENT
Assessment  35y  at 40w0d presents for scheduled repeat  section.     Plan  1. Admit to L+D for scheduled  section. Routine Labs. IVF.  2. NPO at 830a    Plan per attending physician, Dr. Christian Mata PGY1

## 2025-06-15 NOTE — CHART NOTE - NSCHARTNOTEFT_GEN_A_CORE
Patient seen and evaluated at bedside for RN report that patient is hypotensive and feeling dizzy. Patient is POD0 s/p rLTCS, QBL 560cc. Patient received IM methergine, IM hemabate in the OR, is on a methergine series. Patient reports that she is feeling very sleepy and somewhat dizzy. Denies fevers, chills, nausea, vomiting. Patient received 1700cc LR in the OR, 1000cc bolus prior to surgery. Urine output has been clear and adequate.     T(C): 36.7 (06-15-25 @ 11:45), Max: 36.7 (06-15-25 @ 11:45)  HR: 106 (06-15-25 @ 15:15) (80 - 120)  BP: 84/54 (06-15-25 @ 15:15) (84/54 - 126/73)  RR: 18 (06-15-25 @ 15:15) (13 - 24)  SpO2: 98% (06-15-25 @ 15:00) (96% - 100%)    Physical Exam  Gen: NAD, appears sleepy but arousable  CV: warm and well perfused  Pulm: breathing comfortably on room air  Abd: soft, non-tender, non-distended, fundus firm below umbilicus  : scant lochia, Garcia in place draining clear urine    A/P  34yo POD0 s/p rLTCS  with dizziness and hypotension. Patient's baseline BPs 90s-100s/50s-70s, most recently 80s/50s. Patient feeling dizzy, recently received medication from anesthesia.     - STAT CBC  - 500cc LR bolus  - c/w methergine series    d/w Dr. Christian Mata PGY1

## 2025-06-15 NOTE — DISCHARGE NOTE OB - CARE PLAN
Principal Discharge DX:	 delivery delivered  Assessment and plan of treatment:	post op   1 Principal Discharge DX:	 delivery delivered  Assessment and plan of treatment:	post op  Secondary Diagnosis:	Anemia due to acute blood loss  Assessment and plan of treatment:	po iron and ascorbic acid

## 2025-06-15 NOTE — OB PROVIDER H&P - HISTORY OF PRESENT ILLNESS
R1 Admission H&P    Subjective  HPI: 35y  at 40w0d presents for scheduled repeat  section. +FM. -LOF. +CTXs irregularly. -VB. Pt denies any other concerns.    – PNC: Anemia s/p IV Fe x2. GBS neg.  EFW around 9lb per patient.  – OBHx: pLTCS  for cat 2 tracing, 7#8. SABx1 w/ D&C  – GynHx: denies fibroids, cysts, endometriosis, abnormal pap smears, STIs  – PMH: denies  – PSH: breast cyst removal, CSx1, D&C  – Psych: anxiety, no therapy/meds  – Social: denies   – Meds: PNV, Vit D, pepcid  – Allergies: NKDA  – Will accept blood transfusions? Yes

## 2025-06-15 NOTE — DISCHARGE NOTE OB - MEDICATION SUMMARY - MEDICATIONS TO TAKE
I will START or STAY ON the medications listed below when I get home from the hospital:    ibuprofen 600 mg oral tablet  -- 1 tab(s) by mouth every 6 hours as needed for  moderate pain  -- Indication: For Pain     acetaminophen 325 mg oral tablet  -- 3 tab(s) by mouth every 6 hours as needed for  moderate pain  -- Indication: For Pain     ferrous sulfate 325 mg (65 mg elemental iron) oral tablet  -- 1 tab(s) by mouth 2 times a day  -- Indication: For Anemia

## 2025-06-16 LAB
BASOPHILS # BLD AUTO: 0.05 K/UL — SIGNIFICANT CHANGE UP (ref 0–0.2)
BASOPHILS NFR BLD AUTO: 0.4 % — SIGNIFICANT CHANGE UP (ref 0–2)
EOSINOPHIL # BLD AUTO: 0.13 K/UL — SIGNIFICANT CHANGE UP (ref 0–0.5)
EOSINOPHIL NFR BLD AUTO: 1 % — SIGNIFICANT CHANGE UP (ref 0–6)
HCT VFR BLD CALC: 29.3 % — LOW (ref 34.5–45)
HGB BLD-MCNC: 9 G/DL — LOW (ref 11.5–15.5)
IANC: 9.97 K/UL — HIGH (ref 1.8–7.4)
IMM GRANULOCYTES NFR BLD AUTO: 0.6 % — SIGNIFICANT CHANGE UP (ref 0–0.9)
LYMPHOCYTES # BLD AUTO: 1.56 K/UL — SIGNIFICANT CHANGE UP (ref 1–3.3)
LYMPHOCYTES # BLD AUTO: 12.4 % — LOW (ref 13–44)
MCHC RBC-ENTMCNC: 23.7 PG — LOW (ref 27–34)
MCHC RBC-ENTMCNC: 30.7 G/DL — LOW (ref 32–36)
MCV RBC AUTO: 77.1 FL — LOW (ref 80–100)
MONOCYTES # BLD AUTO: 0.75 K/UL — SIGNIFICANT CHANGE UP (ref 0–0.9)
MONOCYTES NFR BLD AUTO: 6 % — SIGNIFICANT CHANGE UP (ref 2–14)
NEUTROPHILS # BLD AUTO: 9.97 K/UL — HIGH (ref 1.8–7.4)
NEUTROPHILS NFR BLD AUTO: 79.6 % — HIGH (ref 43–77)
NRBC # BLD AUTO: 0 K/UL — SIGNIFICANT CHANGE UP (ref 0–0)
NRBC # FLD: 0 K/UL — SIGNIFICANT CHANGE UP (ref 0–0)
NRBC BLD AUTO-RTO: 0 /100 WBCS — SIGNIFICANT CHANGE UP (ref 0–0)
PLATELET # BLD AUTO: 172 K/UL — SIGNIFICANT CHANGE UP (ref 150–400)
RBC # BLD: 3.8 M/UL — SIGNIFICANT CHANGE UP (ref 3.8–5.2)
RBC # FLD: 29.5 % — HIGH (ref 10.3–14.5)
T PALLIDUM AB TITR SER: NEGATIVE — SIGNIFICANT CHANGE UP
WBC # BLD: 12.54 K/UL — HIGH (ref 3.8–10.5)
WBC # FLD AUTO: 12.54 K/UL — HIGH (ref 3.8–10.5)

## 2025-06-16 RX ORDER — IBUPROFEN 200 MG
600 TABLET ORAL EVERY 6 HOURS
Refills: 0 | Status: DISCONTINUED | OUTPATIENT
Start: 2025-06-16 | End: 2025-06-18

## 2025-06-16 RX ORDER — ERGOCALCIFEROL 1.25 MG/1
CAPSULE ORAL
Refills: 0 | DISCHARGE

## 2025-06-16 RX ORDER — OXYCODONE HYDROCHLORIDE 30 MG/1
5 TABLET ORAL
Refills: 0 | Status: DISCONTINUED | OUTPATIENT
Start: 2025-06-16 | End: 2025-06-18

## 2025-06-16 RX ORDER — FERROUS SULFATE 137(45) MG
1 TABLET, EXTENDED RELEASE ORAL
Qty: 0 | Refills: 0 | DISCHARGE
Start: 2025-06-16

## 2025-06-16 RX ORDER — SENNA 187 MG
2 TABLET ORAL AT BEDTIME
Refills: 0 | Status: DISCONTINUED | OUTPATIENT
Start: 2025-06-16 | End: 2025-06-18

## 2025-06-16 RX ORDER — ACETAMINOPHEN 500 MG/5ML
3 LIQUID (ML) ORAL
Qty: 0 | Refills: 0 | DISCHARGE
Start: 2025-06-16

## 2025-06-16 RX ORDER — FERROUS SULFATE 137(45) MG
325 TABLET, EXTENDED RELEASE ORAL
Refills: 0 | Status: DISCONTINUED | OUTPATIENT
Start: 2025-06-16 | End: 2025-06-18

## 2025-06-16 RX ORDER — IBUPROFEN 200 MG
1 TABLET ORAL
Qty: 0 | Refills: 0 | DISCHARGE
Start: 2025-06-16

## 2025-06-16 RX ORDER — PRENATAL 136/IRON/FOLIC ACID 27 MG-1 MG
1 TABLET ORAL DAILY
Refills: 0 | Status: DISCONTINUED | OUTPATIENT
Start: 2025-06-16 | End: 2025-06-18

## 2025-06-16 RX ADMIN — Medication 600 MILLIGRAM(S): at 18:01

## 2025-06-16 RX ADMIN — OXYCODONE HYDROCHLORIDE 5 MILLIGRAM(S): 30 TABLET ORAL at 21:01

## 2025-06-16 RX ADMIN — Medication 975 MILLIGRAM(S): at 15:30

## 2025-06-16 RX ADMIN — Medication 975 MILLIGRAM(S): at 02:21

## 2025-06-16 RX ADMIN — Medication 0.2 MILLIGRAM(S): at 06:09

## 2025-06-16 RX ADMIN — Medication 80 MILLIGRAM(S): at 23:37

## 2025-06-16 RX ADMIN — Medication 975 MILLIGRAM(S): at 20:31

## 2025-06-16 RX ADMIN — KETOROLAC TROMETHAMINE 30 MILLIGRAM(S): 30 INJECTION, SOLUTION INTRAMUSCULAR; INTRAVENOUS at 12:30

## 2025-06-16 RX ADMIN — Medication 2 TABLET(S): at 23:23

## 2025-06-16 RX ADMIN — MAGNESIUM HYDROXIDE 30 MILLILITER(S): 400 SUSPENSION ORAL at 23:37

## 2025-06-16 RX ADMIN — Medication 325 MILLIGRAM(S): at 18:02

## 2025-06-16 RX ADMIN — Medication 0.2 MILLIGRAM(S): at 02:21

## 2025-06-16 RX ADMIN — Medication 975 MILLIGRAM(S): at 09:56

## 2025-06-16 RX ADMIN — Medication 600 MILLIGRAM(S): at 18:56

## 2025-06-16 RX ADMIN — KETOROLAC TROMETHAMINE 30 MILLIGRAM(S): 30 INJECTION, SOLUTION INTRAMUSCULAR; INTRAVENOUS at 06:39

## 2025-06-16 RX ADMIN — Medication 600 MILLIGRAM(S): at 23:52

## 2025-06-16 RX ADMIN — Medication 975 MILLIGRAM(S): at 21:01

## 2025-06-16 RX ADMIN — KETOROLAC TROMETHAMINE 30 MILLIGRAM(S): 30 INJECTION, SOLUTION INTRAMUSCULAR; INTRAVENOUS at 06:09

## 2025-06-16 RX ADMIN — KETOROLAC TROMETHAMINE 30 MILLIGRAM(S): 30 INJECTION, SOLUTION INTRAMUSCULAR; INTRAVENOUS at 11:52

## 2025-06-16 RX ADMIN — OXYCODONE HYDROCHLORIDE 5 MILLIGRAM(S): 30 TABLET ORAL at 20:30

## 2025-06-16 RX ADMIN — HEPARIN SODIUM 5000 UNIT(S): 1000 INJECTION INTRAVENOUS; SUBCUTANEOUS at 11:52

## 2025-06-16 RX ADMIN — Medication 600 MILLIGRAM(S): at 23:22

## 2025-06-16 RX ADMIN — Medication 975 MILLIGRAM(S): at 10:40

## 2025-06-16 RX ADMIN — OXYCODONE HYDROCHLORIDE 5 MILLIGRAM(S): 30 TABLET ORAL at 02:51

## 2025-06-16 RX ADMIN — Medication 975 MILLIGRAM(S): at 14:50

## 2025-06-16 RX ADMIN — Medication 975 MILLIGRAM(S): at 02:51

## 2025-06-16 RX ADMIN — HEPARIN SODIUM 5000 UNIT(S): 1000 INJECTION INTRAVENOUS; SUBCUTANEOUS at 23:22

## 2025-06-16 RX ADMIN — OXYCODONE HYDROCHLORIDE 5 MILLIGRAM(S): 30 TABLET ORAL at 02:21

## 2025-06-17 RX ORDER — POLYETHYLENE GLYCOL 3350 17 G/17G
17 POWDER, FOR SOLUTION ORAL DAILY
Refills: 0 | Status: DISCONTINUED | OUTPATIENT
Start: 2025-06-17 | End: 2025-06-18

## 2025-06-17 RX ADMIN — Medication 975 MILLIGRAM(S): at 03:08

## 2025-06-17 RX ADMIN — Medication 600 MILLIGRAM(S): at 12:25

## 2025-06-17 RX ADMIN — POLYETHYLENE GLYCOL 3350 17 GRAM(S): 17 POWDER, FOR SOLUTION ORAL at 03:54

## 2025-06-17 RX ADMIN — Medication 600 MILLIGRAM(S): at 23:49

## 2025-06-17 RX ADMIN — Medication 600 MILLIGRAM(S): at 11:25

## 2025-06-17 RX ADMIN — Medication 80 MILLIGRAM(S): at 13:45

## 2025-06-17 RX ADMIN — Medication 975 MILLIGRAM(S): at 20:09

## 2025-06-17 RX ADMIN — Medication 975 MILLIGRAM(S): at 09:19

## 2025-06-17 RX ADMIN — HEPARIN SODIUM 5000 UNIT(S): 1000 INJECTION INTRAVENOUS; SUBCUTANEOUS at 23:49

## 2025-06-17 RX ADMIN — Medication 1 TABLET(S): at 11:26

## 2025-06-17 RX ADMIN — Medication 80 MILLIGRAM(S): at 20:09

## 2025-06-17 RX ADMIN — Medication 975 MILLIGRAM(S): at 02:38

## 2025-06-17 RX ADMIN — Medication 975 MILLIGRAM(S): at 15:34

## 2025-06-17 RX ADMIN — Medication 600 MILLIGRAM(S): at 18:06

## 2025-06-17 RX ADMIN — Medication 975 MILLIGRAM(S): at 20:30

## 2025-06-17 RX ADMIN — Medication 600 MILLIGRAM(S): at 17:06

## 2025-06-17 RX ADMIN — Medication 600 MILLIGRAM(S): at 05:14

## 2025-06-17 RX ADMIN — Medication 975 MILLIGRAM(S): at 14:34

## 2025-06-17 RX ADMIN — MAGNESIUM HYDROXIDE 30 MILLILITER(S): 400 SUSPENSION ORAL at 13:45

## 2025-06-17 RX ADMIN — Medication 600 MILLIGRAM(S): at 05:44

## 2025-06-17 RX ADMIN — Medication 80 MILLIGRAM(S): at 03:45

## 2025-06-17 RX ADMIN — Medication 975 MILLIGRAM(S): at 08:19

## 2025-06-17 RX ADMIN — Medication 325 MILLIGRAM(S): at 05:14

## 2025-06-17 RX ADMIN — HEPARIN SODIUM 5000 UNIT(S): 1000 INJECTION INTRAVENOUS; SUBCUTANEOUS at 11:25

## 2025-06-17 NOTE — ED ADULT NURSE NOTE - PAIN RATING/NUMBER SCALE (0-10): REST
Problem: Skin Integrity Alteration  Goal: Skin remains intact with no new/deterioration of wound or pressure injury  Outcome: Monitoring/Evaluating progress   Patient seen by wound RN for wound dressing change. Dressing changed per order. Patient to follow up with clinician and wound RN 6/24/25.  
8

## 2025-06-18 VITALS
TEMPERATURE: 98 F | SYSTOLIC BLOOD PRESSURE: 122 MMHG | OXYGEN SATURATION: 100 % | HEART RATE: 106 BPM | RESPIRATION RATE: 18 BRPM | DIASTOLIC BLOOD PRESSURE: 81 MMHG

## 2025-06-18 RX ADMIN — Medication 975 MILLIGRAM(S): at 03:12

## 2025-06-18 RX ADMIN — HEPARIN SODIUM 5000 UNIT(S): 1000 INJECTION INTRAVENOUS; SUBCUTANEOUS at 11:59

## 2025-06-18 RX ADMIN — Medication 600 MILLIGRAM(S): at 12:30

## 2025-06-18 RX ADMIN — Medication 1 TABLET(S): at 11:59

## 2025-06-18 RX ADMIN — Medication 975 MILLIGRAM(S): at 15:45

## 2025-06-18 RX ADMIN — POLYETHYLENE GLYCOL 3350 17 GRAM(S): 17 POWDER, FOR SOLUTION ORAL at 11:59

## 2025-06-18 RX ADMIN — Medication 975 MILLIGRAM(S): at 09:13

## 2025-06-18 RX ADMIN — Medication 600 MILLIGRAM(S): at 11:59

## 2025-06-18 RX ADMIN — Medication 600 MILLIGRAM(S): at 00:20

## 2025-06-18 RX ADMIN — Medication 80 MILLIGRAM(S): at 08:40

## 2025-06-18 RX ADMIN — Medication 975 MILLIGRAM(S): at 08:40

## 2025-06-18 RX ADMIN — OXYCODONE HYDROCHLORIDE 5 MILLIGRAM(S): 30 TABLET ORAL at 01:28

## 2025-06-18 RX ADMIN — Medication 975 MILLIGRAM(S): at 16:41

## 2025-06-18 RX ADMIN — Medication 600 MILLIGRAM(S): at 05:35

## 2025-06-18 RX ADMIN — OXYCODONE HYDROCHLORIDE 5 MILLIGRAM(S): 30 TABLET ORAL at 02:00

## 2025-06-18 RX ADMIN — Medication 80 MILLIGRAM(S): at 03:12

## 2025-06-18 RX ADMIN — Medication 975 MILLIGRAM(S): at 03:45

## 2025-06-18 RX ADMIN — Medication 600 MILLIGRAM(S): at 06:00

## 2025-06-18 NOTE — PROGRESS NOTE ADULT - SUBJECTIVE AND OBJECTIVE BOX
POST OP DAY  1  s/p   SECTION    SUBJECTIVE:  I'm ok.    PAIN SCALE SCORE: [x] Refer to charted pain scores    THERAPY:  [ x ] Spinal morphine   [  ] Epidural morphine   [  ] IV PCA Hydromorphone 1 mg/ml    OBJECTIVE:  Comfortable Appearing    SEDATION SCORE:	  [ x ] Alert	    [  ] Drowsy        [  ] Arousable	[  ] Asleep	[  ] Unresponsive    Side Effects:	  [ x ] None	     [  ] Nausea        [  ] Pruritus        [  ] Weakness   [  ] Numbness        ASSESSMENT/ PLAN   [   ] Discontinue         [  ] Continue    [ x ] Change to prn Analgesics as per primary service.    DOCUMENTATION & VERIFICATION OF CURRENT MEDS [ x ] Done    COMMENTS: No Headache.  
Post-Operative Note, C/S  She is a  35y woman who is now post-operative day: 2    Subjective:  The patient feels well.  She is ambulating.   She is tolerating regular diet.  She denies nausea and vomiting; denies fever.  She is voiding.  Her pain is controlled; incisional pain is appropriate.  She reports normal postpartum bleeding.      Physical exam:    Vital Signs Last 24 Hrs  T(C): 36.6 (17 Jun 2025 13:38), Max: 37.1 (16 Jun 2025 13:46)  T(F): 97.9 (17 Jun 2025 13:38), Max: 98.7 (16 Jun 2025 13:46)  HR: 98 (17 Jun 2025 13:38) (91 - 99)  BP: 104/62 (17 Jun 2025 13:38) (91/56 - 107/62)  BP(mean): --  RR: 18 (17 Jun 2025 13:38) (18 - 18)  SpO2: 100% (17 Jun 2025 13:38) (100% - 100%)    Parameters below as of 17 Jun 2025 05:30  Patient On (Oxygen Delivery Method): room air        Gen: NAD  Breast: Soft, nontender, not engorged.  Abdomen: Soft, nontender, no distension , firm uterine fundus at umbilicus.  Incision: C/D/I.   Pelvic: Normal lochia noted  Ext: No calf tenderness    LABS:                        9.0    12.54 )-----------( 172      ( 16 Jun 2025 06:45 )             29.3       Rubella status:     Allergies    No Known Allergies    Intolerances      MEDICATIONS  (STANDING):  acetaminophen     Tablet .. 975 milliGRAM(s) Oral <User Schedule>  diphtheria/tetanus/pertussis (acellular) Vaccine (Adacel) 0.5 milliLiter(s) IntraMuscular once  ferrous    sulfate 325 milliGRAM(s) Oral two times a day  heparin   Injectable 5000 Unit(s) SubCutaneous every 12 hours  ibuprofen  Tablet. 600 milliGRAM(s) Oral every 6 hours  polyethylene glycol 3350 17 Gram(s) Oral daily  prenatal multivitamin 1 Tablet(s) Oral daily  senna 2 Tablet(s) Oral at bedtime    MEDICATIONS  (PRN):  diphenhydrAMINE 25 milliGRAM(s) Oral every 6 hours PRN Pruritus  lanolin Ointment 1 Application(s) Topical every 6 hours PRN Sore Nipples  magnesium hydroxide Suspension 30 milliLiter(s) Oral two times a day PRN Constipation  oxyCODONE    IR 5 milliGRAM(s) Oral every 3 hours PRN Moderate to Severe Pain (4-10)  oxyCODONE    IR 5 milliGRAM(s) Oral once PRN Moderate to Severe Pain (4-10)  simethicone 80 milliGRAM(s) Chew every 4 hours PRN Gas        Assessment and Plan    POD #2 s/p C/S.    Doing well.  Encourage ambulation.  Encourage breastfeeding.   PP education materials provided.   Incisional care and PO instructions reviewed.  Reviewed pain management with patient.   CPC.    Plan reviewed with Dr. Bacon

## 2025-06-18 NOTE — PROGRESS NOTE ADULT - ASSESSMENT
Patient seen at bedside resting comfortably offers no new complaints. + Ambulation, + void without difficulty, + flatus;  no bm;  tolerating regular diet. both breastfeeding and bottle feeding. Denies HA, blurry vision or epigastric pain, CP, SOB, N/V/D,  dizziness, palpitations, worsening vaginal bleeding.     Vital Signs Last 24 Hrs  T(C): 36.4 (18 Jun 2025 06:02), Max: 36.6 (17 Jun 2025 13:38)  T(F): 97.6 (18 Jun 2025 06:02), Max: 97.9 (17 Jun 2025 13:38)  HR: 82 (18 Jun 2025 06:02) (82 - 98)  BP: 102/64 (18 Jun 2025 06:02) (102/64 - 106/66)  BP(mean): --  RR: 16 (18 Jun 2025 06:02) (16 - 18)  SpO2: 100% (18 Jun 2025 06:02) (100% - 100%)    Parameters below as of 18 Jun 2025 06:02  Patient On (Oxygen Delivery Method): room air        Gen: A&O x 3, NAD  Chest: CTA B/L  Cardiac: S1,S2  RRR  Breast: Soft, nontender, nonengorged  Abdomen: +BS; soft; Nontender, nondistended, Incision C/D/I steri strips in place   Gyn: Minimal lochia  Extremities: Nontender, DTRS 2+, no worsening edema        A/P: 35yr old F POD #3 s/p r/c/s on 6/15/2025     #Anemia   -   - Starting H/H 11.3/37.6 --> 9.0/29.3  - Fe supplements   - Pt asymptomatic     #PP   - Meeting PP milestones   - Pain management as needed  - Oxy, Motrin, and Tylenol sent to home pharmacy via [employer] EMR  - OOB and ambulate  - Incision C/D/I- dermabond   - Encourage breastfeeding   - D/c home   - Instructions verbalized  - Follow up in 1-2weeks in office for incision check    -d/w dr Christian Douglas NP
Patient seen at bedside resting comfortably offers no new complaints. not yet ambulating, batista just removed no void spontaneously yet.  + flatus;  no bm; tolerating clr liq diet. both breast and bottle feeding. Denies HA, blurry vision or epigastric pain, CP, SOB, N/V/D,  dizziness, palpitations, worsening vaginal bleeding.    Vital Signs Last 24 Hrs  T(C): 36.8 (16 Jun 2025 09:45), Max: 37.1 (15 Romario 2025 19:15)  T(F): 98.2 (16 Jun 2025 09:45), Max: 98.8 (15 Romario 2025 19:15)  HR: 82 (16 Jun 2025 09:45) (74 - 117)  BP: 90/57 (16 Jun 2025 09:45) (83/51 - 112/65)  BP(mean): 66 (15 Romario 2025 19:15) (40 - 84)  RR: 18 (16 Jun 2025 09:45) (13 - 24)  SpO2: 100% (16 Jun 2025 09:45) (96% - 100%)    Parameters below as of 16 Jun 2025 05:18  Patient On (Oxygen Delivery Method): room air        Gen: A&O x 3, NAD  Chest: CTA B/L  Cardiac: S1,S2  RRR  Breast: Soft, nontender, nonengorged  Abdomen: +BS; soft; Nontender, nondistended; dressing removed incision C/D/I dermabond in  place  Gyn: minimal lochia   Extremities: Nontender, venodynes in place                          9.0    12.54 )-----------( 172      ( 16 Jun 2025 06:45 )             29.3       A/P: 35yr old F POD #1 s/p r/c/s on 6/15/2025     #Anemia   -   - Starting H/H 11.3/37.6 --> 9.0/29.3  - Fe supplements   - Pt asymptomatic   - BPs 80-100s/50-60s  - PO hydration encouraged     #PP   - Pain management as needed  - OOB and ambulate  - Incision C/D/I- dermabond   - Advance diet to regular  - Encourage breastfeeding     -d/w Dr. Nasim Douglas NP